# Patient Record
Sex: MALE | Race: OTHER
[De-identification: names, ages, dates, MRNs, and addresses within clinical notes are randomized per-mention and may not be internally consistent; named-entity substitution may affect disease eponyms.]

---

## 2017-06-27 ENCOUNTER — HOSPITAL ENCOUNTER (OUTPATIENT)
Dept: HOSPITAL 53 - M SMT | Age: 72
End: 2017-06-27
Attending: NURSE PRACTITIONER
Payer: MEDICARE

## 2017-06-27 DIAGNOSIS — R82.90: Primary | ICD-10-CM

## 2017-06-27 PROCEDURE — 87186 SC STD MICRODIL/AGAR DIL: CPT

## 2017-06-27 PROCEDURE — 87088 URINE BACTERIA CULTURE: CPT

## 2017-06-27 PROCEDURE — 51798 US URINE CAPACITY MEASURE: CPT

## 2017-06-27 PROCEDURE — 81001 URINALYSIS AUTO W/SCOPE: CPT

## 2017-11-09 ENCOUNTER — HOSPITAL ENCOUNTER (OUTPATIENT)
Dept: HOSPITAL 53 - M LAB REF | Age: 72
End: 2017-11-09
Attending: INTERNAL MEDICINE
Payer: MEDICARE

## 2017-11-09 DIAGNOSIS — N39.0: Primary | ICD-10-CM

## 2017-11-29 ENCOUNTER — HOSPITAL ENCOUNTER (OUTPATIENT)
Dept: HOSPITAL 53 - M SMT | Age: 72
End: 2017-11-29
Attending: NURSE PRACTITIONER
Payer: MEDICARE

## 2017-11-29 DIAGNOSIS — N39.0: Primary | ICD-10-CM

## 2017-11-29 PROCEDURE — 51798 US URINE CAPACITY MEASURE: CPT

## 2017-11-29 PROCEDURE — 87088 URINE BACTERIA CULTURE: CPT

## 2017-11-29 PROCEDURE — 87186 SC STD MICRODIL/AGAR DIL: CPT

## 2017-11-29 PROCEDURE — 81001 URINALYSIS AUTO W/SCOPE: CPT

## 2017-12-13 ENCOUNTER — HOSPITAL ENCOUNTER (OUTPATIENT)
Dept: HOSPITAL 53 - M SFHCCAPE | Age: 72
End: 2017-12-13
Attending: PHYSICIAN ASSISTANT
Payer: MEDICARE

## 2017-12-13 DIAGNOSIS — L03.115: Primary | ICD-10-CM

## 2017-12-14 ENCOUNTER — HOSPITAL ENCOUNTER (INPATIENT)
Dept: HOSPITAL 53 - M ED | Age: 72
LOS: 8 days | Discharge: HOME HEALTH | DRG: 291 | End: 2017-12-22
Attending: INTERNAL MEDICINE | Admitting: HOSPITALIST
Payer: MEDICARE

## 2017-12-14 VITALS — BODY MASS INDEX: 39.47 KG/M2 | HEIGHT: 74 IN | WEIGHT: 307.54 LBS

## 2017-12-14 DIAGNOSIS — Z66: ICD-10-CM

## 2017-12-14 DIAGNOSIS — I25.10: ICD-10-CM

## 2017-12-14 DIAGNOSIS — Z79.82: ICD-10-CM

## 2017-12-14 DIAGNOSIS — Z87.891: ICD-10-CM

## 2017-12-14 DIAGNOSIS — E66.9: ICD-10-CM

## 2017-12-14 DIAGNOSIS — D50.9: ICD-10-CM

## 2017-12-14 DIAGNOSIS — I70.0: ICD-10-CM

## 2017-12-14 DIAGNOSIS — E87.3: ICD-10-CM

## 2017-12-14 DIAGNOSIS — I13.0: Primary | ICD-10-CM

## 2017-12-14 DIAGNOSIS — I87.2: ICD-10-CM

## 2017-12-14 DIAGNOSIS — I27.29: ICD-10-CM

## 2017-12-14 DIAGNOSIS — Z79.899: ICD-10-CM

## 2017-12-14 DIAGNOSIS — Z85.46: ICD-10-CM

## 2017-12-14 DIAGNOSIS — L97.919: ICD-10-CM

## 2017-12-14 DIAGNOSIS — N18.4: ICD-10-CM

## 2017-12-14 DIAGNOSIS — E87.6: ICD-10-CM

## 2017-12-14 DIAGNOSIS — E11.22: ICD-10-CM

## 2017-12-14 DIAGNOSIS — I25.2: ICD-10-CM

## 2017-12-14 DIAGNOSIS — I50.33: ICD-10-CM

## 2017-12-14 DIAGNOSIS — E78.5: ICD-10-CM

## 2017-12-14 DIAGNOSIS — I48.2: ICD-10-CM

## 2017-12-14 DIAGNOSIS — Z79.01: ICD-10-CM

## 2017-12-14 DIAGNOSIS — G47.33: ICD-10-CM

## 2017-12-14 DIAGNOSIS — E11.42: ICD-10-CM

## 2017-12-14 LAB
ALBUMIN SERPL BCG-MCNC: 3.2 GM/DL (ref 3.2–5.2)
ALBUMIN/GLOB SERPL: 0.91 {RATIO} (ref 1–1.93)
ALP SERPL-CCNC: 106 U/L (ref 45–117)
ALT SERPL W P-5'-P-CCNC: 21 U/L (ref 12–78)
ANION GAP SERPL CALC-SCNC: 10 MEQ/L (ref 8–16)
AST SERPL-CCNC: 21 U/L (ref 7–37)
BASOPHILS # BLD AUTO: 0 10^3/UL (ref 0–0.2)
BASOPHILS NFR BLD AUTO: 0.2 % (ref 0–1)
BILIRUB CONJ SERPL-MCNC: 1 MG/DL (ref 0–0.2)
BILIRUB SERPL-MCNC: 2.4 MG/DL (ref 0.2–1)
BUN SERPL-MCNC: 100 MG/DL (ref 7–18)
CALCIUM SERPL-MCNC: 8.7 MG/DL (ref 8.8–10.2)
CHLORIDE SERPL-SCNC: 104 MEQ/L (ref 98–107)
CO2 SERPL-SCNC: 25 MEQ/L (ref 21–32)
CREAT SERPL-MCNC: 2.9 MG/DL (ref 0.7–1.3)
EOSINOPHIL # BLD AUTO: 0.1 10^3/UL (ref 0–0.5)
EOSINOPHIL NFR BLD AUTO: 0.6 % (ref 0–3)
ERYTHROCYTE [DISTWIDTH] IN BLOOD BY AUTOMATED COUNT: 20.7 % (ref 11.5–14.5)
GFR SERPL CREATININE-BSD FRML MDRD: 22.8 ML/MIN/{1.73_M2} (ref 42–?)
GLUCOSE SERPL-MCNC: 107 MG/DL (ref 83–110)
IMM GRANULOCYTES NFR BLD: 0.8 % (ref 0–0)
INR PPP: 2.56
LYMPHOCYTES # BLD AUTO: 0.8 10^3/UL (ref 1.5–4.5)
LYMPHOCYTES NFR BLD AUTO: 6.3 % (ref 24–44)
MCH RBC QN AUTO: 24.4 PG (ref 27–33)
MCHC RBC AUTO-ENTMCNC: 29.8 G/DL (ref 32–36.5)
MCV RBC AUTO: 81.9 FL (ref 80–96)
MONOCYTES # BLD AUTO: 1 10^3/UL (ref 0–0.8)
MONOCYTES NFR BLD AUTO: 8.1 % (ref 0–5)
NEUTROPHILS # BLD AUTO: 10.8 10^3/UL (ref 1.8–7.7)
NEUTROPHILS NFR BLD AUTO: 84 % (ref 36–66)
NRBC BLD AUTO-RTO: 0 % (ref 0–0)
PLATELET # BLD AUTO: 212 10^3/UL (ref 150–450)
POTASSIUM SERPL-SCNC: 3.8 MEQ/L (ref 3.5–5.1)
PROT SERPL-MCNC: 6.7 GM/DL (ref 6.4–8.2)
SODIUM SERPL-SCNC: 139 MEQ/L (ref 136–145)
WBC # BLD AUTO: 12.8 10^3/UL (ref 4–10)

## 2017-12-14 RX ADMIN — PRAVASTATIN SODIUM SCH MG: 20 TABLET ORAL at 21:00

## 2017-12-14 RX ADMIN — GABAPENTIN SCH MG: 300 CAPSULE ORAL at 22:43

## 2017-12-14 RX ADMIN — ASPIRIN SCH MG: 81 TABLET ORAL at 22:43

## 2017-12-14 NOTE — REPUSA
CLINICAL HISTORY:  Shortness of breath.

 

TECHNIQUE:  Multiple axial, coronal, sagittal CT images were obtained through chest without IV contra
st material.

 

COMMENTS:

 

There is no evidence of pleural or parenchymal-based mass.  Small right pleural effusion and no left 
pleural effusion is present.  There is no evidence of hilar or mediastinal lymphadenopathy.  The hear
t is severely enlarged.  Pulmonary venous congestive changes are present.  

 

Note is made of diffusely increased interstitial lungs markings with peripheral and bibasilar predomi
nance compatible with pulmonary fibrosis.  Few calcified nodules are present in the right lower lobe,
 the largest measures 4 mm.

 

Mild scattered upper lobe predominant centrilobular emphysema is seen.

 

Several small calcified granulomas are present in the left lower lobe measuring up to 2.5 mm.

 

The visualized portions of the liver are of uniform attenuation without mass or defect.  There is no 
intra or extrahepatic biliary ductal dilatation.  The spleen is unremarkable.  The visualized pancrea
s is of normal contour and attenuation characteristics.  There is no evidence of adrenal mass.  The v
isualized portions of the kidneys present no abnormalities.

 

The bony structures are free of lytic or blastic lesions.  Multilevel degenerative changes are seen i
nvolving the thoracic spine. Scattered calcifications are seen involving the aorta and visualized lane
or branches compatible with atherosclerosis.

 

IMPRESSION:

 

1.  Cardiomegaly.

2.  CHF.

3.  Right pleural effusion.

4.  Sequelae of prior granulomatous disease.

5.  Evidence of pulmonary fibrosis.

6.  Mild scattered emphysema.

 

     Electronically signed by NURIS CAMACHO MD on 12/14/2017 08:05:42 PM ET

## 2017-12-14 NOTE — HPE
DATE OF ADMISSION:  12/14/2017

 

ATTENDING PHYSICIAN:  Dr. Barak Zimmerman

 

CARDIOLOGIST:  Dr. Figueredo

 

NEPHROLOGIST:  Dr. Moreno

 

CHIEF COMPLAINT:  Worsening swelling of bilateral lower extremities.

 

HISTORY OF THE PRESENT ILLNESS:  The patient is a 72-year-old white male with

multiple chronic medical conditions, including congestive heart failure (CHF) 
and

chronic kidney disease stage IV, presented to the emergency room (ER) for

evaluation of above complaints. The history is provided by himself; however, he

is a poor historian. Per the patient, he has a history of CHF, and his

cardiologist is Dr. Figueredo. He was taking torsemide 40 mg twice a day at home.

Also, he has chronic kidney disease stage IV and he followed up with Dr. Moreno.  He stated recently he was treated with 10 days antibiotics, but he

could not remember the name of antibiotic ordered for possible urinary tract

infection by urologist. In the last couple of days, he said he has more swelling

in his bilateral lower extremities, particularly there is some fluid that came

out from his right lower extremity and that is why he went to urgent care and

then transferred here to the ER for further evaluation. In the ER, he was found

to have fluid overload, and he was given intravenous (IV) Lasix in the ER.

Meanwhile, medicine service called for admission. Otherwise, no other 
complaints.

 

 

REVIEW OF SYSTEMS: Denies fever. No chills. No headache. No blurred vision. No

shortness of breath. No coughing. No chest pain. No abdominal pain. No diarrhea.

No constipation. No dysuria. No weakness in the arms or lower extremities but

general weakness. All other systems were reviewed but negative.

 

PAST MEDICAL HISTORY:

1. Hypertension.

2. Chronic kidney disease stage IV.

3. Chronic atrial fibrillation, on Coumadin.

4. Congestive heart failure, ejection fraction (EF) is around 35%.

5. Type 2 diabetes, which is diet controlled.

 

PAST SURGICAL HISTORY: Stone was removed from his bladder and prostate cancer

with radiation therapy.

 

ALLERGIES:  No known drug allergies.

 

SOCIAL HISTORY: He has remote tobacco use, quit 40 years. No alcohol abuse. No

illicit drug abuse. He is living with wife. He is a [employer].

 

FAMILY HISTORY: Positive for gastric cancer.

 

MEDICATIONS:  Reviewed including:

- torsemide 40 mg twice a day

- Coumadin 2 mg daily

- potassium chloride 10 mEq daily

- Neurontin 300 mg three times a day

- pravastatin 80 mg by mouth daily

- Coreg 12.5 mg by mouth daily

- allopurinol 300 mg by mouth daily

 

PHYSICAL EXAMINATION:

VITAL SIGNS: Temperature 97.5, heart rate is 113, respirations 22, blood 
pressure

110/57, oxygen saturation is 95% on 2 liters of oxygen.

GENERAL: He is awake, alert, oriented times three. He is not in acute distress.

HEENT: Atraumatic. Pupils equal, round, reactive to light. Extraocular muscles

intact. No jaundice. Ears, nose and throat are normal.

NECK: No jugular venous distention (JVD), no bruits.

LUNGS: Clear. No wheezing, no crackles.

HEART; S1, S2, mild tachycardia, irregular but no murmur.

ABDOMEN: Soft. Bowel sounds positive, nontender.

LOWER EXTREMITIES: He has +2 edema in his bilateral lower extremities.

NEUROLOGIC: Nonfocal.

SKIN: No rash.

PSYCHOLOGIC: No acute psychosis.

 

DIAGNOSTIC AND LAB STUDIES: Include the following: CBC and differential showed

WBC 12.8, hemoglobin and hematocrit 12 over 40, platelets 212. Sodium 139,

potassium 3.8, chloride 104, bicarbonate 25, , creatinine 2.9, glucose

107, bilirubin is 2.4 but liver enzymes normal. Troponin negative. BNP is up to

5297 and INR was 2.5.

 

Chest CT as well as chest x-ray were reviewed, not significant.

 

IMPRESSION:

1. Acute on chronic systolic congestive heart failure.

2. Chronic kidney disease stage IV.

3. Chronic atrial fibrillation.

4. Type 2 diabetes.

5. Hypertension.

 

PLAN: He will be admitted to the progressive care unit (PCU). It seems like he

has  fluid overload, so I will treat him with IV Lasix 60 mg IV

twice a day. Will closely monitor his electrolytes as well as his kidney

function. I will schedule a 2D echocardiogram and will consult Dr. Figueredo. He

has abnormal urinalysis, but I do not think he has a urinary tract infection,

probably related to his prostate cancer plus he was treated recently with

antibiotics.  Due to mildly increased creatinine from his baseline, I will 
schedule

a renal ultrasound to rule out any obstruction. He is on Coumadin for atrial

fibrillation, which is therapeutic, so he does not need actual deep vein

thrombosis (DVT) prophylaxis.

[facility]

## 2017-12-14 NOTE — REP
HISTORY: Dyspnea.

 

COMPARISON: 12/28/2015

 

The technique utilized in obtaining the radiograph has magnified the cardiac

silhouette and accentuated the interstitial markings.

 

 

 

There is global cardiomegaly. There is pulmonary vascular redistribution and a

haziness seen throughout the pulmonary vascularity. There are no patchy opacities

or pleural effusions.

 

IMPRESSION:

 

CHF.

 

 

Signed by

Aamir Pulliam DO 12/15/2017 02:10 P

## 2017-12-14 NOTE — ECGEPIP
Stationary ECG Study

                           [facility] - ED

                                       

                                       Test Date:    2017

Pat Name:     FEI CAMACHO           Department:   

Patient ID:   [id number]                 Room:         -

Gender:       M                        Technician:   rachna

:          1945               Requested By: Toyin Scanlon 

Order Number: [id number]     Reading MD:   Curry Reis

                                 Measurements

Intervals                              Axis          

Rate:         76                       P:            

NJ:           0                        QRS:          158

QRSD:         105                      T:            -7

QT:           426                                    

QTc:          482                                    

                           Interpretive Statements

ATRIAL FIBRILLATION

INCOMPLETE RIGHT BUNDLE BRANCH BLOCK

RIGHT VENTRICULAR HYPERTROPHY

POSSIBLE ANTERIOR MYOCARDIAL INFARCTION, PROBABLY OLD

POSSIBLE INFERIOR MYOCARDIAL INFARCTION, PROBABLY OLD

SIMILAR TO 12/28/15

 

Electronically Signed On 2017 20:20:59 EST by Curry Reis

## 2017-12-15 VITALS — SYSTOLIC BLOOD PRESSURE: 102 MMHG | DIASTOLIC BLOOD PRESSURE: 62 MMHG

## 2017-12-15 VITALS — DIASTOLIC BLOOD PRESSURE: 59 MMHG | SYSTOLIC BLOOD PRESSURE: 101 MMHG

## 2017-12-15 VITALS — DIASTOLIC BLOOD PRESSURE: 63 MMHG | SYSTOLIC BLOOD PRESSURE: 106 MMHG

## 2017-12-15 LAB
ALBUMIN SERPL BCG-MCNC: 3 GM/DL (ref 3.2–5.2)
ALBUMIN/GLOB SERPL: 1 {RATIO} (ref 1–1.93)
ALP SERPL-CCNC: 95 U/L (ref 45–117)
ALT SERPL W P-5'-P-CCNC: 19 U/L (ref 12–78)
ANION GAP SERPL CALC-SCNC: 12 MEQ/L (ref 8–16)
AST SERPL-CCNC: 21 U/L (ref 7–37)
BASOPHILS # BLD AUTO: 0 10^3/UL (ref 0–0.2)
BASOPHILS NFR BLD AUTO: 0.4 % (ref 0–1)
BILIRUB SERPL-MCNC: 2 MG/DL (ref 0.2–1)
BUN SERPL-MCNC: 92 MG/DL (ref 7–18)
CALCIUM SERPL-MCNC: 8.5 MG/DL (ref 8.8–10.2)
CHLORIDE SERPL-SCNC: 105 MEQ/L (ref 98–107)
CO2 SERPL-SCNC: 25 MEQ/L (ref 21–32)
CREAT SERPL-MCNC: 2.77 MG/DL (ref 0.7–1.3)
EOSINOPHIL # BLD AUTO: 0.2 10^3/UL (ref 0–0.5)
EOSINOPHIL NFR BLD AUTO: 2.2 % (ref 0–3)
ERYTHROCYTE [DISTWIDTH] IN BLOOD BY AUTOMATED COUNT: 20.6 % (ref 11.5–14.5)
GFR SERPL CREATININE-BSD FRML MDRD: 24 ML/MIN/{1.73_M2} (ref 42–?)
GLUCOSE SERPL-MCNC: 100 MG/DL (ref 83–110)
IMM GRANULOCYTES NFR BLD: 0.6 % (ref 0–0)
INR PPP: 2.72
LYMPHOCYTES # BLD AUTO: 0.9 10^3/UL (ref 1.5–4.5)
LYMPHOCYTES NFR BLD AUTO: 8 % (ref 24–44)
MAGNESIUM SERPL-MCNC: 2.2 MG/DL (ref 1.8–2.4)
MCH RBC QN AUTO: 24.7 PG (ref 27–33)
MCHC RBC AUTO-ENTMCNC: 30.2 G/DL (ref 32–36.5)
MCV RBC AUTO: 81.8 FL (ref 80–96)
MONOCYTES # BLD AUTO: 1 10^3/UL (ref 0–0.8)
MONOCYTES NFR BLD AUTO: 9.2 % (ref 0–5)
NEUTROPHILS # BLD AUTO: 8.7 10^3/UL (ref 1.8–7.7)
NEUTROPHILS NFR BLD AUTO: 79.6 % (ref 36–66)
NRBC BLD AUTO-RTO: 0 % (ref 0–0)
PLATELET # BLD AUTO: 214 10^3/UL (ref 150–450)
POTASSIUM SERPL-SCNC: 3.6 MEQ/L (ref 3.5–5.1)
PROT SERPL-MCNC: 6 GM/DL (ref 6.4–8.2)
SODIUM SERPL-SCNC: 142 MEQ/L (ref 136–145)
WBC # BLD AUTO: 10.9 10^3/UL (ref 4–10)

## 2017-12-15 RX ADMIN — DOCUSATE SODIUM,SENNOSIDES SCH TAB: 50; 8.6 TABLET, FILM COATED ORAL at 20:30

## 2017-12-15 RX ADMIN — DEXTROSE MONOHYDRATE SCH MG: 50 INJECTION, SOLUTION INTRAVENOUS at 09:36

## 2017-12-15 RX ADMIN — WARFARIN SODIUM SCH MG: 2.5 TABLET ORAL at 17:52

## 2017-12-15 RX ADMIN — PRAVASTATIN SODIUM SCH MG: 20 TABLET ORAL at 20:30

## 2017-12-15 RX ADMIN — ASPIRIN SCH MG: 81 TABLET ORAL at 20:29

## 2017-12-15 RX ADMIN — GABAPENTIN SCH MG: 300 CAPSULE ORAL at 20:29

## 2017-12-15 RX ADMIN — DEXTROSE MONOHYDRATE SCH MG: 50 INJECTION, SOLUTION INTRAVENOUS at 20:28

## 2017-12-15 RX ADMIN — DEXTROSE MONOHYDRATE SCH MG: 50 INJECTION, SOLUTION INTRAVENOUS at 21:00

## 2017-12-15 RX ADMIN — DOCUSATE SODIUM,SENNOSIDES SCH TAB: 50; 8.6 TABLET, FILM COATED ORAL at 09:37

## 2017-12-15 RX ADMIN — CALCITRIOL SCH MCG: 0.25 CAPSULE ORAL at 09:36

## 2017-12-15 NOTE — REP
Renal ultrasound:

 

The right kidney measures 13.7 by 6.6 x 7.1 cm.

The left kidney measures 2.7 by 4.8 by 5.7 cm.

 

Renal cortical echogenicity is normal bilaterally.

There is no hydronephrosis on the right on the left.

 

There are multiple left renal cysts, largest measuring up to 4.1 cm.  There are

no right renal cysts.  No solid masses.

 

There are no calculi.

 

Bladder ultrasound:

 

The bladder is distended with 490 ml of fluid.  No bladder wall polyps or masses

are identified.  The bladder is otherwise unremarkable.

 

Impression:

 

The kidneys at least 2 cm for in length and the right kidney.  There are multiple

left renal cysts.

 

Otherwise, negative renal ultrasound.  No

 

 

Signed by

Erik Santamaria MD 12/15/2017 07:23 A

## 2017-12-15 NOTE — IPN
DATE:  12/15/2017

 

Patient seen and examined.  Admitted overnight.  Reported respirations much

improved.  Bilateral lower extremity swelling also much improved.  With right

lower extremity there is a ruptured blister with drainage.  As per patient, has

been going on for the past 5-6 days.  Denies any chest pain, pressure, or

discomfort.  Denies any fevers or chills.  No sick contacts.  Lives at home with

his wife.

 

VITAL SIGNS:  Temperature 98.3, pulse 76, respirations 18, blood pressure 138/63,

pulse oximetry 96% on room air.

 

LABORATORY DATA:

WBC 10.9, hemoglobin and hematocrit 11.5/38.1, platelets 214.

 

Chemistry:  Sodium 142, potassium 3.6, chloride 105, bicarbonate 25, BUN 92,

creatinine 2.77.

 

PHYSICAL EXAMINATION:

GENERAL:  Patient alert and oriented times three, obese, in no acute distress.

HEENT:  Normocephalic, atraumatic.

PULMONARY:  Bilateral clear to auscultation.  No wheezing, rales, or rhonchi.

CARDIAC:  2/6 systolic murmur, regular, S1, S2.

ABDOMEN:  Soft, nontender, obese.

EXTREMITIES:  1+ bilateral lower extremity edema, with venous stasis skin

changes, right lower extremity, around the shin area with an ulcer with skin

breakdown with purulent drainage.

 

ASSESSMENT AND PLAN:  This is a 72-year-old male patient with underlying medical

history of congestive heart failure (CHF), with ejection fraction of 35%, chronic

kidney disease (CKD) stage IV, baseline creatinine 2.6, chronic atrial

fibrillation, on Coumadin, type 2 diabetes, diet controlled, hypertension,

presented to the emergency room with worsening lower extremity swelling and also

ulcer with purulent drainage.

 

1.  Right lower extremity venous stasis ulcers.  Case discussed with Dr. Stillerman.  Physical therapy (PT), wound care has been consulted.  Recommending

venous ultrasound, standing, as per Dr. Stillerman with Vashe and Optifoam

dressing daily and leg elevation.  Continue diuresis as below.  Followup with Dr. Stillerman as outpatient.  Will make appointment for the patient upon discharge.

 

2.  Bilateral lower extremity swelling, likely secondary to congestive heart

failure (CHF) exacerbation with systolic dysfunction, ejection fraction (EF) of

35%.  Diuresis.  Cardiac enzymes.  Telemetry monitoring.  Followup kidney

function.  Dr. Figueredo has been consulted.  Strict intake and output, daily

weight.  Continue statin, aspirin.

 

3.  Atrial fibrillation.  Continue beta blockers.  Anticoagulation with Coumadin.

Followup INR.

 

4.  Dyslipidemia.  Continue statin.

 

5.  Diabetes.  Diet controlled.  Followup glucose.

 

6.  Peripheral neuropathy.  Continue Neurontin.

 

7.  Hypertension.  Monitor blood pressure.  Continue Lasix and Coreg, adjust as

needed.  Followup electrolytes.

 

8.  Deep venous thrombosis (DVT) prophylaxis.  Patient on Coumadin for atrial

fibrillation with therapeutic INR.

 

DISPOSITION:  Pending clinical improvement, wound care, and a cardiology

consultation.  Echocardiogram has also been ordered.  Need outpatient followup

with Dr. Stillerman.

## 2017-12-15 NOTE — REP
BILATERAL LOWER EXTREMITY DUPLEX DOPPLER VENOUS ULTRASOUND WITH EVALUATION FOR

VENOUS REFLUX:

 

Real-time compression and duplex Doppler interrogation of bilateral lower

extremity deep venous systems is performed.

 

Bilaterally, common femoral, superficial femoral and popliteal veins are fully

compressible with transducer pressure and demonstrate normal spontaneous and

phasic flow without evidence of deep venous thrombosis. There is focal dilatation

of greater saphenous vein in the upper thigh as well as in the mid thigh on the

left.

 

Evaluation for venous reflux on the right demonstrates reflux in the common

femoral vein. There is an anterior accessory greater saphenous vein present with

reflux. There is no reflux in the proximal to mid greater saphenous vein which

measures 6 and 7 mm in AP dimension respectively. There is reflux in the greater

saphenous vein at the level of the knee with a duration of 3.6 seconds, AP

diameter of that vessel is 6 mm. There is no reflux in any portion of the

superficial femoral vein, popliteal vein or lesser saphenous vein which measures

4 mm. Anterior accessory greater saphenous vein communicates with the greater

saphenous vein at the distal thigh.

 

On the left, there is reflux in the common femoral vein. There is no evidence of

an anterior accessory greater saphenous vein. There is reflux in the greater

saphenous vein at the saphenofemoral junction with a duration of 2.1 seconds, AP

diameter 12 mm. There is reflux in the greater saphenous vein at the midthigh

with a duration of 2.7 seconds, AP diameter 9 mm, and there is reflux n the

greater saphenous vein at the level of the knee with a duration of 2.5 seconds,

AP diameter 11 mm. There is no reflux in any portion of the superficial femoral

vein, popliteal vein or lesser saphenous vein.

 

 

Signed by

Erik Falcon MD 12/15/2017 05:04 P

## 2017-12-16 VITALS — SYSTOLIC BLOOD PRESSURE: 120 MMHG | DIASTOLIC BLOOD PRESSURE: 69 MMHG

## 2017-12-16 VITALS — DIASTOLIC BLOOD PRESSURE: 58 MMHG | SYSTOLIC BLOOD PRESSURE: 102 MMHG

## 2017-12-16 VITALS — DIASTOLIC BLOOD PRESSURE: 54 MMHG | SYSTOLIC BLOOD PRESSURE: 95 MMHG

## 2017-12-16 VITALS — SYSTOLIC BLOOD PRESSURE: 122 MMHG | DIASTOLIC BLOOD PRESSURE: 62 MMHG

## 2017-12-16 VITALS — SYSTOLIC BLOOD PRESSURE: 96 MMHG | DIASTOLIC BLOOD PRESSURE: 52 MMHG

## 2017-12-16 VITALS — DIASTOLIC BLOOD PRESSURE: 67 MMHG | SYSTOLIC BLOOD PRESSURE: 110 MMHG

## 2017-12-16 LAB
ANION GAP SERPL CALC-SCNC: 6 MEQ/L (ref 8–16)
BUN SERPL-MCNC: 95 MG/DL (ref 7–18)
CALCIUM SERPL-MCNC: 8.9 MG/DL (ref 8.8–10.2)
CHLORIDE SERPL-SCNC: 105 MEQ/L (ref 98–107)
CO2 SERPL-SCNC: 30 MEQ/L (ref 21–32)
CREAT SERPL-MCNC: 2.5 MG/DL (ref 0.7–1.3)
ERYTHROCYTE [DISTWIDTH] IN BLOOD BY AUTOMATED COUNT: 21 % (ref 11.5–14.5)
GFR SERPL CREATININE-BSD FRML MDRD: 27 ML/MIN/{1.73_M2} (ref 42–?)
GLUCOSE SERPL-MCNC: 90 MG/DL (ref 83–110)
INR PPP: 2.66
MAGNESIUM SERPL-MCNC: 2.3 MG/DL (ref 1.8–2.4)
MCH RBC QN AUTO: 23.7 PG (ref 27–33)
MCHC RBC AUTO-ENTMCNC: 29.2 G/DL (ref 32–36.5)
MCV RBC AUTO: 81.3 FL (ref 80–96)
NRBC BLD AUTO-RTO: 0 % (ref 0–0)
PLATELET # BLD AUTO: 184 10^3/UL (ref 150–450)
POTASSIUM SERPL-SCNC: 4.2 MEQ/L (ref 3.5–5.1)
SODIUM SERPL-SCNC: 141 MEQ/L (ref 136–145)
WBC # BLD AUTO: 11.2 10^3/UL (ref 4–10)

## 2017-12-16 RX ADMIN — WARFARIN SODIUM SCH MG: 2.5 TABLET ORAL at 16:09

## 2017-12-16 RX ADMIN — GABAPENTIN SCH MG: 300 CAPSULE ORAL at 21:16

## 2017-12-16 RX ADMIN — PRAVASTATIN SODIUM SCH MG: 20 TABLET ORAL at 21:15

## 2017-12-16 RX ADMIN — DEXTROSE MONOHYDRATE SCH MG: 50 INJECTION, SOLUTION INTRAVENOUS at 21:15

## 2017-12-16 RX ADMIN — CALCITRIOL SCH MCG: 0.25 CAPSULE ORAL at 08:43

## 2017-12-16 RX ADMIN — DEXTROSE MONOHYDRATE SCH MG: 50 INJECTION, SOLUTION INTRAVENOUS at 08:43

## 2017-12-16 RX ADMIN — DOCUSATE SODIUM,SENNOSIDES SCH TAB: 50; 8.6 TABLET, FILM COATED ORAL at 21:16

## 2017-12-16 RX ADMIN — ASPIRIN SCH MG: 81 TABLET ORAL at 21:15

## 2017-12-16 RX ADMIN — DOCUSATE SODIUM,SENNOSIDES SCH TAB: 50; 8.6 TABLET, FILM COATED ORAL at 08:43

## 2017-12-16 NOTE — IPNPDOC
Text Note


Date of Service


The patient was seen on 12/16/17.





NOTE


Subjective:


Patient seen and examined.  Patient denies any new medical complaints. States 

his breathing is improving. He expressed concerns regarding his weeping right 

lower extremity edema.





Objective:


GENERAL:  NAD, lying comfortably in bed


HEENT:  NC/AT, EOMI


PULMONARY:  CTA B/L


CARDIAC:  2/6 systolic murmur, regular, +S1S2.


ABDOMEN:  Soft, NT, +BS, obese.


EXTREMITIES:  1+ bilateral lower extremity edema, with venous stasis skin


changes, right lower extremity, around the shin area with an ulcer with skin


breakdown with purulent drainage.


 


ASSESSMENT AND PLAN:  This is a 72-year-old male patient with underlying medical


history of congestive heart failure (CHF), with ejection fraction of 35%, 

chronic


kidney disease (CKD) stage IV, baseline creatinine 2.6, chronic atrial


fibrillation, on Coumadin, type 2 diabetes, diet controlled, hypertension,


presented to the emergency room with worsening lower extremity swelling and also


ulcer with purulent drainage.


 


#RLE venous stasis ulcers


- Case has been discussed with Dr. Stillerman.


- PT wound care has been consulted


- US negative for DVT


- as per wound care - Vashe and Optifoam dressing daily and leg elevation


- Continue diuresis as below


- Followup with Dr. Stillerman as outpatient


 


#Decompensated CHF


- systolic dysfunction, ejection fraction (EF) of 35%


- continue IV lasix - consider increasing dose - slightly positive fluid 

balance as per documentation, no change in documented weight


- Telemetry monitoring.


- Followup renal function


- Follow as per cardiology


- strict I'O's, daily weight


- Continue statin, aspirin.





#episode of NSVT


- asymptomatic


- VSS


- check electrolytes


- consider transfer to tele


 


#Atrial fibrillation.  Continue beta blockers.  Anticoagulation with Coumadin.


Followup INR.


 


#Dyslipidemia.  Continue statin.


 


#Diabetes.  Diet controlled.  Followup glucose.


 


#Peripheral neuropathy.  Continue Neurontin.


 


#Hypertension.  Monitor blood pressure.  Continue Lasix and Coreg, adjust as


needed.  Followup electrolytes.


 


#Deep venous thrombosis (DVT) prophylaxis.  Patient on Coumadin for atrial


fibrillation with therapeutic INR.





VS,Fishbone, I+O


VS, Fishbone, I+O


Laboratory Tests


12/16/17 05:08








Red Blood Count 4.93, Mean Corpuscular Volume 81.3, Mean Corpuscular Hemoglobin 

23.7 L, Mean Corpuscular Hemoglobin Concent 29.2 L, Red Cell Distribution Width 

21.0 H, Calcium Level 8.9








Vital Signs








  Date Time  Temp Pulse Resp B/P (MAP) Pulse Ox O2 Delivery O2 Flow Rate FiO2


 


12/16/17 08:43  78  122/62    


 


12/16/17 06:00 97.9  18  92 Nasal Cannula 3.0 

















KHADRA HERNANDEZ MD Dec 16, 2017 09:02

## 2017-12-16 NOTE — CR
DATE OF CONSULTATION:  12/15/2017

 

REFERRING PHYSICIAN:  Dr. Cooper.

 

REASON FOR CONSULTATION:  Acute on chronic heart failure with preserved ejection

fraction.

 

Mr. Ancelmo Sexton is a pleasant 72-year-old man with coronary artery disease,

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

ischemic cardiomyopathy and now chronic systolic heart failure with preserved

ejection fraction, systemic hypertension, abnormal ECG, frequent PVCs,

hyperlipidemia, chronic kidney disease stage IV, nephrolithiasis, iron deficiency

anemia, type 2 diabetes, obstructive sleep apnea (continuous positive airway

pressure (CPAP) intolerant), and obesity.  Patient presented to the hospital for

admission yesterday (2017) after being seen by his primary care provider

earlier that day when he was noted to have bilateral edema in both legs, venous

stasis ulcer that was open and draining and a severely elevated brain natruretic

peptide level.  Patient reports he has had progressive buildup of edema in both

legs for the past 2 months and also the past 2 months has had progressive

worsening of exertional dyspnea to the point of exertional dyspnea walking around

his garage.  No orthopnea or paroxysmal nocturnal dyspnea (PND).  No chest, neck,

jaw or upper extremity pain, pressure, tightness or squeezing with or without

exertion.  No presyncope or syncope.  No palpitations.  No embolic events.  No

intermittent claudication.

 

I shall summarize additional parts of his prior cardiac history.  He was first

discovered to have evidence of a sign of heart attack about 6 or 7 years ago.  He

subsequently underwent further evaluation with cardiac catheterization at [facility] in [place] and he was told he had "dead heart muscle."  No

percutaneous coronary intervention was indicated at that time.

 

A stress echocardiogram from [facility] in [place], [place]

2003, reported poor cardiopulmonary fitness for age.  Exercise induced

regional wall motion abnormalities consistent with multivessel coronary artery

disease (CAD).  Resting left ventricular ejection fraction (LVEF) 50-55%.

Moderate tricuspid regurgitation, estimated pulmonary systolic pressure 54 mmHg.

No regional wall motion abnormalities at rest, but with exercise stress there was

hypokinesis of the inferior basal segment, hypokinesis of the anterolateral wall

and hypokinesis involving the mid and distal anteroseptal region.  A diagnostic

heart catheterization was recommended at that time.

 

MUGA scan 2016 showed LVEF 46.4%.  Akinesis of inferior basal region of the

left ventricle with normal wall motion elsewhere.  Mild reduction overall LV

systolic function.  Normal right ventricle systolic function.

 

Holter monitor 2016, at which time patient was on carvedilol 25 mg twice a

day, showed atrial fibrillation throughout with heart rate 39-82 beats per minute

(BPM) with average heart rate of 60 BPM.  Very frequent PVCs 27,556 (29.1%)

including frequent runs with rates from  BPM and ranging in duration from

4-6 beats.  No symptoms in the Holter diary.

 

Echocardiogram Doppler 2015 from [facility] reported low

normal LV systolic function with LVEF estimated to be 50%.  Inferobasal

segmented.  Akinetic.  No other regional wall motion abnormalities.  Severe

biatrial enlargement.  Mild dilatation of the left ventricle and right ventricle.

Aortic valve sclerosis.  Mitral annular calcification.  Moderate tricuspid

regurgitation.  Mild pulmonary hypertension.  Mild mitral regurgitation.

 

PAST MEDICAL HISTORY:

1.  Chronic heart failure with preserved left ventricle systolic function.

2.  Old silent inferior wall myocardial infarct.

3.  Chronic atrial fibrillation.

4.  CAD.

5.  Type 2 diabetes.

6.  Systemic hypertension.

7.  Hyperlipidemia.

8.  Prostate cancer.

9.  Hyperparathyroidism.

10.  Ulcerative colitis.

11.  Abnormal ECG, very frequent multiform PVCs.

12.  Atherosclerosis of the abdominal aorta.

13.  Chronic kidney disease stage IV followed by Dr. Boilvar Moreno.

14.  Nephrolithiasis.

15.  Iron deficiency anemia.

16.  Obstructive sleep apnea (intolerant of CPAP).

17.  Obesity.

18.  Previous removal of bladder stones.

 

No known adverse drug reactions.

 

MEDICATIONS:  Prior to admission:

-  allopurinol 300 mg daily and additional allopurinol 100 mg daily (total dose

400 mg daily)

-  aspirin enteric coated 81 mg nightly

-  Calcitriol 0.25 mcg by mouth daily

-  carvedilol 12.5 mg twice a day

-  Flonase

-  gabapentin 300 mg nightly and additional dose as needed

-  potassium chloride 10 mEq twice a day

-  pravastatin 80 mg nightly

-  torsemide 40 mg twice a day

-  warfarin 2.5 mg daily or as directed

-  Ambien 10 mg nightly

 

CURRENT MEDICATIONS:  In hospital are as follows:

-  acetaminophen 650 mg every 4 hours as needed

-  aspirin 81 mg nightly

-  Calcitriol 0.25 mcg by mouth daily

-  carvedilol 12.5 mg twice a day

-  Flonase daily as needed

-  furosemide 60 mg IV twice a day

-  Neurontin 300 mg nightly and additional 300 mg nightly as needed  for pain

-  pravastatin 80 mg nightly

-  Senokot-S one tablet twice a day

-  warfarin 2.5 mg daily

-  Ambien 10 mg nightly

 

FAMILY HISTORY:  Father  from cancer.  Mother  from cancer.  One

sister had cancer.

 

SOCIAL HISTORY:  , resident of [place], [place], retired healthcare

marketing.  Currently independent with all activities of daily living.  Prior

smoking history of one pack per day times 20 years which he quit over 30 years

ago.  No alcohol.  Exercise limitations secondary to shortness of breath and

fatigue and heaviness in the legs.

 

REVIEW OF SYSTEMS:  As per history of present illness (HPI) above.  Wears glasses

for reading.  Decreased hearing.  Does not wear hearing aids.  Nonproductive

cough.  History of gastrointestinal (GI) ulcer.  History of dysphagia.  Chronic

kidney disease.  Bilateral leg swelling.  Stasis dermatitis in both legs and

recent weeping of venous ulcer right leg.  Impaired balance.  Numbness and

tingling of the left hand and legs.  No anxiety, depression or panic disorder.

Cold intolerance.  Iron deficiency anemia.  All other 10-point review of systems

questions otherwise negative other than HPI above.

 

PHYSICAL EXAMINATION:

Pleasant, obese, elderly man who appears chronologic age.  .

Height 74 inches, weight 134.1 kg, body mass index (BMI) 38.0.  Temperature 96.8,

pulse 75 (irregular), respiratory rate 20, blood pressure 101/59, oxygen

saturation 95% on oxygen 3 liters per minute by nasal cannula.

No dysmorphic features or deformities.  Not in any respiratory or psychological

distress.  No conjunctival pallor, scleral icterus or xanthomas.  Some dental

fillings.  Oral mucosa was moist and without pallor or cyanosis.  Trachea

midline. Thyroid not palpable.  Jugular venous pulsations were at 8 cm.

Respiratory expansion and effort was good.  No crackles or wheezes appreciated.

 

No left parasternal heaves, thrills or palpable heart sounds.  No palpable apex

beat.  First heart sound variable.  Second heart sound normal.  No S3.  No

pericardial friction rub.  Grade 1 systolic ejection murmur right second

interspace without radiation.  Carotids are normal in volume and contour and

without bruits.  Abdominal aorta not palpable due to abdominal obesity.  Femoral

pulses normal. Pedal pulses normal.  4 mm pitting edema present bilaterally with

extensive stasis dermatitis.  I did not unwrap the bandages that were covering

the patient's right leg where he is reported to have some open venous stasis

ulcers.  No varicose veins.  No clubbing, nailbed cyanosis or splinter

hemorrhages.

Bowel sounds positive.  Abdomen soft, nontender with normal bowel sounds.  No

abdominal bruits.  No hepatosplenomegaly or organomegaly.  Liver span not

measurable due to abdominal obesity.

Stool for occult blood not presently indicated.

Gait not fully tested because patient is under restricted activity.  No kyphosis

or scoliosis.  Gross motor strength and tone appear normal.  No fasciculations or

tremors.

Oriented to person, place and time.  Mood and affect normal.

 

I have reviewed the patient's electrocardiogram from 2017 at 1817 hours.

It shows atrial fibrillation, ventricular rate 76 BPM, incomplete right bundle

branch block, low precordial voltages, borderline low limb lead voltages, right

axis deviation, nonspecific ST-T abnormalities.  Poor R-wave progression.  Cannot

rule out old inferior wall myocardial infarct.

 

I have independently visualized the patient's portable AP chest x-ray sitting

obtained 2017 at 6:25 p.m.  It shows cardiomegaly despite the portable

technique.  Right pleural effusion.  Pulmonary vascular redistribution is

present.  Increased interstitial markings consistent with interstitial pulmonary

edema bilaterally.

 

Laboratory work 12/15/2017 was reviewed.  Hemoglobin 11.5, hematocrit 38.1, WBC

10.9,  platelets 214.  PT/INR 2.72.

 

Laboratory work 2017 showed sodium 139, potassium 3.8, chloride 104, CO2

25, , creatinine 2.90, estimated GFR 22.8, glucose 107, CPK-MB 3.0,

troponin I 0.04.

 

Laboratory work 2017 also showed NT-proBNP 5297, albumin 3.2, TSH 3.850,

total bilirubin elevated at 2.4 and direct bilirubin elevated at 1.0.

 

Laboratory work 12/15/2017 was reviewed:  Sodium 142, potassium 3.6, chloride

105, CO2 25, BUN 92, creatinine 2.77, estimated GFR 24.0, magnesium 2.0, albumin

3.0.

 

ASSESSMENT AND PLAN:

1.  Acute on chronic diastolic heart failure.  This is complicated by right heart

failure with chronic stasis dermatitis and now open weeping venous stasis ulcer.

At present, he is decompensated and has evidence of interstitial pulmonary edema.

Further making matters worse is the presence of chronic kidney disease stage IV.

Use of gabapentin and abdominal obesity and diabetes all likely contribute to the

presence of bilateral lower extremity edema.  I agree with intravenous (IV)

furosemide.  Continue carvedilol.  Because of the degree of renal dysfunction, he

is really not a candidate for angiotensin-converting enzyme inhibitor (ACEI) or

ARB.  Also for the same reason, he is not really a candidate to tolerate

mineralocorticoid receptor antagonist.  I will increase the dose of IV furosemide

because he does not have a strong net negative fluid balance thus far today.

 

2.  Coronary artery disease (CAD) (native vessel).  History of old silent

inferior wall myocardial infarct.  Previous cardiac catheterization.   I

recommend continued medical therapy.  Continue low-dose aspirin.  Continue

carvedilol.  Not presently a candidate for ACEI or ARB because of advanced kidney

disease.

 

3.  Old inferior wall myocardial infarct.  As per CAD category above.  Stable.

 

4.  Chronic atrial fibrillation.  Heart rate controlled on carvedilol.  Continue

carvedilol and warfarin.  Target international normalized ratio (INR) 2.5-3.0.

 

5.  Systemic hypertension.  Blood pressure controlled.  Will follow with you.

 

6.  Abnormal ECG.  ECG as described above.

 

7.  Frequent multiform PVCs.  No sustained ventricular tachycardia.

Asymptomatic.  Stable.

 

8.  Hyperlipidemia.  Continue statin therapy.  Recommend DASH diet.

 

9.  Obesity.  Associated CAD, hyperlipidemia, systemic hypertension, atrial

fibrillation, chronic kidney disease, type 2 diabetes.  Recommend DASH diet.

## 2017-12-17 VITALS — DIASTOLIC BLOOD PRESSURE: 56 MMHG | SYSTOLIC BLOOD PRESSURE: 114 MMHG

## 2017-12-17 VITALS — DIASTOLIC BLOOD PRESSURE: 61 MMHG | SYSTOLIC BLOOD PRESSURE: 118 MMHG

## 2017-12-17 VITALS — SYSTOLIC BLOOD PRESSURE: 106 MMHG | DIASTOLIC BLOOD PRESSURE: 58 MMHG

## 2017-12-17 VITALS — DIASTOLIC BLOOD PRESSURE: 54 MMHG | SYSTOLIC BLOOD PRESSURE: 104 MMHG

## 2017-12-17 VITALS — DIASTOLIC BLOOD PRESSURE: 60 MMHG | SYSTOLIC BLOOD PRESSURE: 90 MMHG

## 2017-12-17 VITALS — DIASTOLIC BLOOD PRESSURE: 68 MMHG | SYSTOLIC BLOOD PRESSURE: 110 MMHG

## 2017-12-17 VITALS — DIASTOLIC BLOOD PRESSURE: 64 MMHG | SYSTOLIC BLOOD PRESSURE: 120 MMHG

## 2017-12-17 LAB
ANION GAP SERPL CALC-SCNC: 9 MEQ/L (ref 8–16)
BUN SERPL-MCNC: 90 MG/DL (ref 7–18)
CALCIUM SERPL-MCNC: 8.5 MG/DL (ref 8.8–10.2)
CHLORIDE SERPL-SCNC: 104 MEQ/L (ref 98–107)
CO2 SERPL-SCNC: 28 MEQ/L (ref 21–32)
CREAT SERPL-MCNC: 2.43 MG/DL (ref 0.7–1.3)
ERYTHROCYTE [DISTWIDTH] IN BLOOD BY AUTOMATED COUNT: 21 % (ref 11.5–14.5)
GFR SERPL CREATININE-BSD FRML MDRD: 27.9 ML/MIN/{1.73_M2} (ref 42–?)
GLUCOSE SERPL-MCNC: 88 MG/DL (ref 83–110)
INR PPP: 2.27
MAGNESIUM SERPL-MCNC: 2 MG/DL (ref 1.8–2.4)
MCH RBC QN AUTO: 24.2 PG (ref 27–33)
MCHC RBC AUTO-ENTMCNC: 29.7 G/DL (ref 32–36.5)
MCV RBC AUTO: 81.6 FL (ref 80–96)
NRBC BLD AUTO-RTO: 0.2 % (ref 0–0)
PLATELET # BLD AUTO: 179 10^3/UL (ref 150–450)
POTASSIUM SERPL-SCNC: 3.4 MEQ/L (ref 3.5–5.1)
SODIUM SERPL-SCNC: 141 MEQ/L (ref 136–145)
WBC # BLD AUTO: 12.4 10^3/UL (ref 4–10)

## 2017-12-17 RX ADMIN — CALCITRIOL SCH MCG: 0.25 CAPSULE ORAL at 08:33

## 2017-12-17 RX ADMIN — DOCUSATE SODIUM,SENNOSIDES SCH TAB: 50; 8.6 TABLET, FILM COATED ORAL at 20:23

## 2017-12-17 RX ADMIN — BISOPROLOL FUMARATE SCH MG: 5 TABLET ORAL at 16:17

## 2017-12-17 RX ADMIN — GABAPENTIN SCH MG: 300 CAPSULE ORAL at 20:21

## 2017-12-17 RX ADMIN — DOCUSATE SODIUM,SENNOSIDES SCH TAB: 50; 8.6 TABLET, FILM COATED ORAL at 08:34

## 2017-12-17 RX ADMIN — ASPIRIN SCH MG: 81 TABLET ORAL at 20:20

## 2017-12-17 RX ADMIN — FUROSEMIDE SCH MG: 10 INJECTION, SOLUTION INTRAMUSCULAR; INTRAVENOUS at 20:20

## 2017-12-17 RX ADMIN — PRAVASTATIN SODIUM SCH MG: 20 TABLET ORAL at 20:21

## 2017-12-17 RX ADMIN — WARFARIN SODIUM SCH MG: 2.5 TABLET ORAL at 16:13

## 2017-12-17 RX ADMIN — DEXTROSE MONOHYDRATE SCH MG: 50 INJECTION, SOLUTION INTRAVENOUS at 08:54

## 2017-12-17 RX ADMIN — DOCUSATE SODIUM,SENNOSIDES SCH TAB: 50; 8.6 TABLET, FILM COATED ORAL at 20:21

## 2017-12-17 NOTE — IPNPDOC
Text Note


Date of Service


The patient was seen on 12/17/17.





NOTE


Subjective:


Patient seen and examined.  Patient denies any new medical complaints. States 

his breathing is improving. He is having cravings for cigarettes but states 

they are manageable.





Objective:


GENERAL:  NAD, lying comfortably in bed


HEENT:  NC/AT, EOMI


PULMONARY:  CTA B/L


CARDIAC:  2/6 systolic murmur, regular, +S1S2.


ABDOMEN:  Soft, NT, +BS, obese.


EXTREMITIES:  1+ bilateral lower extremity edema, with venous stasis skin


changes, right lower extremity, around the shin area with an ulcer with skin


breakdown with purulent drainage.


 


ASSESSMENT AND PLAN:  This is a 72-year-old male patient with underlying medical


history of congestive heart failure (CHF), with ejection fraction of 35%, 

chronic


kidney disease (CKD) stage IV, baseline creatinine 2.6, chronic atrial


fibrillation, on Coumadin, type 2 diabetes, diet controlled, hypertension,


presented to the emergency room with worsening lower extremity swelling and also


ulcer with purulent drainage.


 


#RLE venous stasis ulcers


- Case has been discussed with Dr. Stillerman.


- PT wound care has been consulted


- US negative for DVT


- as per wound care - Vashe and Optifoam dressing daily and leg elevation


- Continue diuresis as below


- Followup with Dr. Stillerman as outpatient


 


#Decompensated CHF


- systolic dysfunction, ejection fraction (EF) of 35%


- continue IV lasix - received metolazone x 1


- Telemetry monitoring.


- Followup renal function


- Follow as per cardiology - assistance appreciated


- strict I'O's, daily weight


- Continue statin, aspirin.





#Atrial fibrillation.  Continue beta blockers.  Anticoagulation with Coumadin.


Followup INR.


 


#Dyslipidemia.  Continue statin.


 


#Diabetes.  Diet controlled.  Followup glucose.


 


#Peripheral neuropathy.  Continue Neurontin.


 


#Hypertension


- pressures have been on the low side - likely discontinue coreg - discuss 

further with cardiology


 


#Deep venous thrombosis (DVT) prophylaxis.  Patient on Coumadin for atrial


fibrillation with therapeutic INR.





VS,Fishbone, I+O


VS, Fishbone, I+O


Laboratory Tests


12/17/17 05:52








Red Blood Count 4.79, Mean Corpuscular Volume 81.6, Mean Corpuscular Hemoglobin 

24.2 L, Mean Corpuscular Hemoglobin Concent 29.7 L, Red Cell Distribution Width 

21.0 H, Calcium Level 8.5 L








Vital Signs








  Date Time  Temp Pulse Resp B/P (MAP) Pulse Ox O2 Delivery O2 Flow Rate FiO2


 


12/17/17 08:56    90/60 (70)    


 


12/17/17 06:00 98.0 80 17  91 Nasal Cannula 3.0 

















KHADRA HERNANDEZ MD Dec 17, 2017 09:59

## 2017-12-18 VITALS — DIASTOLIC BLOOD PRESSURE: 68 MMHG | SYSTOLIC BLOOD PRESSURE: 126 MMHG

## 2017-12-18 VITALS — DIASTOLIC BLOOD PRESSURE: 72 MMHG | SYSTOLIC BLOOD PRESSURE: 117 MMHG

## 2017-12-18 VITALS — SYSTOLIC BLOOD PRESSURE: 113 MMHG | DIASTOLIC BLOOD PRESSURE: 58 MMHG

## 2017-12-18 VITALS — DIASTOLIC BLOOD PRESSURE: 63 MMHG | SYSTOLIC BLOOD PRESSURE: 114 MMHG

## 2017-12-18 VITALS — SYSTOLIC BLOOD PRESSURE: 116 MMHG | DIASTOLIC BLOOD PRESSURE: 55 MMHG

## 2017-12-18 VITALS — SYSTOLIC BLOOD PRESSURE: 133 MMHG | DIASTOLIC BLOOD PRESSURE: 71 MMHG

## 2017-12-18 VITALS — SYSTOLIC BLOOD PRESSURE: 125 MMHG | DIASTOLIC BLOOD PRESSURE: 73 MMHG

## 2017-12-18 VITALS — SYSTOLIC BLOOD PRESSURE: 127 MMHG | DIASTOLIC BLOOD PRESSURE: 68 MMHG

## 2017-12-18 LAB
ANION GAP SERPL CALC-SCNC: 6 MEQ/L (ref 8–16)
BUN SERPL-MCNC: 100 MG/DL (ref 7–18)
CALCIUM SERPL-MCNC: 9.3 MG/DL (ref 8.8–10.2)
CHLORIDE SERPL-SCNC: 103 MEQ/L (ref 98–107)
CO2 SERPL-SCNC: 31 MEQ/L (ref 21–32)
CREAT SERPL-MCNC: 2.35 MG/DL (ref 0.7–1.3)
ERYTHROCYTE [DISTWIDTH] IN BLOOD BY AUTOMATED COUNT: 21 % (ref 11.5–14.5)
GFR SERPL CREATININE-BSD FRML MDRD: 29 ML/MIN/{1.73_M2} (ref 42–?)
GLUCOSE SERPL-MCNC: 100 MG/DL (ref 83–110)
MAGNESIUM SERPL-MCNC: 2.1 MG/DL (ref 1.8–2.4)
MCH RBC QN AUTO: 24.3 PG (ref 27–33)
MCHC RBC AUTO-ENTMCNC: 30.1 G/DL (ref 32–36.5)
MCV RBC AUTO: 80.9 FL (ref 80–96)
NRBC BLD AUTO-RTO: 0 % (ref 0–0)
PLATELET # BLD AUTO: 203 10^3/UL (ref 150–450)
POTASSIUM SERPL-SCNC: 3.3 MEQ/L (ref 3.5–5.1)
SODIUM SERPL-SCNC: 140 MEQ/L (ref 136–145)
WBC # BLD AUTO: 12.8 10^3/UL (ref 4–10)

## 2017-12-18 RX ADMIN — ASPIRIN SCH MG: 81 TABLET ORAL at 22:15

## 2017-12-18 RX ADMIN — DOCUSATE SODIUM,SENNOSIDES SCH TAB: 50; 8.6 TABLET, FILM COATED ORAL at 09:45

## 2017-12-18 RX ADMIN — FUROSEMIDE SCH MG: 10 INJECTION, SOLUTION INTRAMUSCULAR; INTRAVENOUS at 22:43

## 2017-12-18 RX ADMIN — PRAVASTATIN SODIUM SCH MG: 20 TABLET ORAL at 22:15

## 2017-12-18 RX ADMIN — DOCUSATE SODIUM,SENNOSIDES SCH TAB: 50; 8.6 TABLET, FILM COATED ORAL at 22:15

## 2017-12-18 RX ADMIN — BISOPROLOL FUMARATE SCH MG: 5 TABLET ORAL at 09:44

## 2017-12-18 RX ADMIN — WARFARIN SODIUM SCH MG: 2.5 TABLET ORAL at 17:01

## 2017-12-18 RX ADMIN — CALCITRIOL SCH MCG: 0.25 CAPSULE ORAL at 09:45

## 2017-12-18 RX ADMIN — FUROSEMIDE SCH MG: 10 INJECTION, SOLUTION INTRAMUSCULAR; INTRAVENOUS at 09:44

## 2017-12-18 RX ADMIN — GABAPENTIN SCH MG: 300 CAPSULE ORAL at 22:16

## 2017-12-18 NOTE — IPNPDOC
Text Note


Date of Service


The patient was seen on 12/18/17.





NOTE


Subjective:


Patient seen and examined.  Patient denies any new medical complaints. 

Continues to feel better.





Objective:


GENERAL:  NAD, lying comfortably in bed


HEENT:  NC/AT, EOMI


PULMONARY:  CTA B/L


CARDIAC:  2-3/6 systolic murmur, regular, +S1S2.


ABDOMEN:  Soft, NT, +BS, obese.


EXTREMITIES:  1+ bilateral lower extremity edema, with venous stasis skin


changes, right lower extremity, around the shin area with an ulcer with skin


breakdown with purulent drainage.


 


ASSESSMENT AND PLAN:  This is a 72-year-old male patient with underlying medical


history of congestive heart failure (CHF), with ejection fraction of 35%, 

chronic


kidney disease (CKD) stage IV, baseline creatinine 2.6, chronic atrial


fibrillation, on Coumadin, type 2 diabetes, diet controlled, hypertension,


presented to the emergency room with worsening lower extremity swelling and also


ulcer with purulent drainage.


 


#RLE venous stasis ulcers


- Case has been discussed with Dr. Stillerman.


- PT wound care has been consulted


- US negative for DVT


- as per wound care - Vashe and Optifoam dressing daily and leg elevation


- Continue diuresis as below


- Followup with Dr. Stillerman as outpatient


 


#Decompensated CHF


- systolic dysfunction, ejection fraction (EF) of 35%


- continue IV lasix - dosage increased - received metolazone x 1


- Telemetry monitoring.


- Followup renal function


- Follow as per cardiology - assistance appreciated


- strict I'O's, daily weight


- Continue statin, aspirin.





#Atrial fibrillation


- Continue zebeta


- Anticoagulation with Coumadin - Followup INR.


 


#Dyslipidemia.  Continue statin.


 


#Diabetes.  Diet controlled.  Followup glucose.


 


#Peripheral neuropathy.  Continue Neurontin.


 


#Hypertension


- pressures improved - coreg was transitioned to zebeta


 


#Deep venous thrombosis (DVT) prophylaxis.  Patient on Coumadin for atrial


fibrillation with therapeutic INR.





VS,Fishbone, I+O


VS, Fishbone, I+O


Laboratory Tests


12/18/17 05:30








Red Blood Count 4.97, Mean Corpuscular Volume 80.9, Mean Corpuscular Hemoglobin 

24.3 L, Mean Corpuscular Hemoglobin Concent 30.1 L, Red Cell Distribution Width 

21.0 H, Calcium Level 9.3








Vital Signs








  Date Time  Temp Pulse Resp B/P (MAP) Pulse Ox O2 Delivery O2 Flow Rate FiO2


 


12/18/17 09:44  81  116/55    


 


12/18/17 06:00 97.4  18  94 Nasal Cannula 3.0 

















KHADRA HERNANDEZ MD Dec 18, 2017 11:01

## 2017-12-19 VITALS — SYSTOLIC BLOOD PRESSURE: 116 MMHG | DIASTOLIC BLOOD PRESSURE: 78 MMHG

## 2017-12-19 VITALS — SYSTOLIC BLOOD PRESSURE: 100 MMHG | DIASTOLIC BLOOD PRESSURE: 68 MMHG

## 2017-12-19 VITALS — DIASTOLIC BLOOD PRESSURE: 64 MMHG | SYSTOLIC BLOOD PRESSURE: 112 MMHG

## 2017-12-19 VITALS — DIASTOLIC BLOOD PRESSURE: 80 MMHG | SYSTOLIC BLOOD PRESSURE: 115 MMHG

## 2017-12-19 VITALS — SYSTOLIC BLOOD PRESSURE: 129 MMHG | DIASTOLIC BLOOD PRESSURE: 58 MMHG

## 2017-12-19 VITALS — DIASTOLIC BLOOD PRESSURE: 56 MMHG | SYSTOLIC BLOOD PRESSURE: 116 MMHG

## 2017-12-19 LAB
ANION GAP SERPL CALC-SCNC: 12 MEQ/L (ref 8–16)
ANION GAP SERPL CALC-SCNC: 4 MEQ/L (ref 8–16)
BUN SERPL-MCNC: 100 MG/DL (ref 7–18)
BUN SERPL-MCNC: 105 MG/DL (ref 7–18)
CALCIUM SERPL-MCNC: 8.8 MG/DL (ref 8.8–10.2)
CALCIUM SERPL-MCNC: 8.8 MG/DL (ref 8.8–10.2)
CHLORIDE SERPL-SCNC: 102 MEQ/L (ref 98–107)
CHLORIDE SERPL-SCNC: 102 MEQ/L (ref 98–107)
CO2 SERPL-SCNC: 27 MEQ/L (ref 21–32)
CO2 SERPL-SCNC: 34 MEQ/L (ref 21–32)
CREAT SERPL-MCNC: 2.25 MG/DL (ref 0.7–1.3)
CREAT SERPL-MCNC: 2.29 MG/DL (ref 0.7–1.3)
ERYTHROCYTE [DISTWIDTH] IN BLOOD BY AUTOMATED COUNT: 21.4 % (ref 11.5–14.5)
GFR SERPL CREATININE-BSD FRML MDRD: 29.9 ML/MIN/{1.73_M2} (ref 42–?)
GFR SERPL CREATININE-BSD FRML MDRD: 30.5 ML/MIN/{1.73_M2} (ref 42–?)
GLUCOSE SERPL-MCNC: 110 MG/DL (ref 83–110)
GLUCOSE SERPL-MCNC: 91 MG/DL (ref 83–110)
INR PPP: 1.82
MCH RBC QN AUTO: 24.1 PG (ref 27–33)
MCHC RBC AUTO-ENTMCNC: 30.1 G/DL (ref 32–36.5)
MCV RBC AUTO: 80.3 FL (ref 80–96)
NRBC BLD AUTO-RTO: 0.1 % (ref 0–0)
PLATELET # BLD AUTO: 177 10^3/UL (ref 150–450)
POTASSIUM SERPL-SCNC: 2.7 MEQ/L (ref 3.5–5.1)
POTASSIUM SERPL-SCNC: 3.3 MEQ/L (ref 3.5–5.1)
SODIUM SERPL-SCNC: 140 MEQ/L (ref 136–145)
SODIUM SERPL-SCNC: 141 MEQ/L (ref 136–145)
WBC # BLD AUTO: 13.9 10^3/UL (ref 4–10)

## 2017-12-19 RX ADMIN — DOCUSATE SODIUM,SENNOSIDES SCH TAB: 50; 8.6 TABLET, FILM COATED ORAL at 08:08

## 2017-12-19 RX ADMIN — POTASSIUM CHLORIDE SCH MLS/HR: 7.46 INJECTION, SOLUTION INTRAVENOUS at 10:28

## 2017-12-19 RX ADMIN — POTASSIUM CHLORIDE SCH MLS/HR: 7.46 INJECTION, SOLUTION INTRAVENOUS at 12:40

## 2017-12-19 RX ADMIN — SALINE NASAL SPRAY SCH SPRAY: 1.5 SOLUTION NASAL at 20:35

## 2017-12-19 RX ADMIN — ASPIRIN SCH MG: 81 TABLET ORAL at 20:35

## 2017-12-19 RX ADMIN — BISOPROLOL FUMARATE SCH MG: 5 TABLET ORAL at 08:05

## 2017-12-19 RX ADMIN — POTASSIUM CHLORIDE SCH MLS/HR: 7.46 INJECTION, SOLUTION INTRAVENOUS at 08:34

## 2017-12-19 RX ADMIN — FUROSEMIDE SCH MG: 10 INJECTION, SOLUTION INTRAMUSCULAR; INTRAVENOUS at 08:53

## 2017-12-19 RX ADMIN — DOCUSATE SODIUM,SENNOSIDES SCH TAB: 50; 8.6 TABLET, FILM COATED ORAL at 20:35

## 2017-12-19 RX ADMIN — GABAPENTIN SCH MG: 300 CAPSULE ORAL at 20:35

## 2017-12-19 RX ADMIN — PRAVASTATIN SODIUM SCH MG: 20 TABLET ORAL at 20:35

## 2017-12-19 RX ADMIN — CALCITRIOL SCH MCG: 0.25 CAPSULE ORAL at 08:06

## 2017-12-19 RX ADMIN — SALINE NASAL SPRAY SCH SPRAY: 1.5 SOLUTION NASAL at 08:06

## 2017-12-19 NOTE — IPN
DATE OF VISIT:  12/19/2017

 

SUBJECTIVE:  Patient is seen in the room today.  Patient still requires oxygen to

maintain satisfactory oxygen saturation.  Per patient, patient does not use any

oxygen at home.  I had a chance to talk to patient's wife.  Per family member,

patient has not been compliant with fluid restriction at home prior to the

hospitalization.  All of her questions are answered.

 

OBJECTIVE:

VITAL SIGNS:  Temperature 98, pulse is 77, respirations 18, blood pressure

116/78, pulse oximetry is 93% with 3 liter nasal cannula.

GENERAL:  No sign of acute distress.  Alert and oriented times three.

HEENT:  Normocephalic, atraumatic.

CARDIOVASCULAR:  Irregularly irregular.  Positive S1, S2.  Positive systolic

murmur.

LUNGS:  Positive crackles in the lung bilateral base.  No wheezes.

ABDOMEN:  Soft, nontender, nondistended, morbidly obese.

EXTREMITIES:  1+ edema in lower extremities.  Positive venous stasis skin

changes.  There is also oozing of the lower extremity most significant right

anterior shin cover with a foam dressing.  There is fluid noted on the dressing.

 

 

LABORATORY DATA:  WBC is 15.9, hemoglobin 11.9, hematocrit 39.6, platelet count

is 177.

 

Sodium is 140, potassium 2.7, chloride is 102, bicarbonate 34.  BUN is 105,

creatinine is 2.29.  GFR is 29.9.  Fasting glucose is 91.  Calcium is 8.8.

 

ASSESSMENT AND PLAN:

1.  Systolic congestive heart failure exacerbation.  Ejection fraction (EF) is

35%.  Currently, patient is on intravenous (IV) Lasix.  Cardiology, Dr. Figueredo

has been assisting on the case.  Appreciate his input.  Patient is currently on

fluid restriction.  Continue volume input and output and daily weight.

 

2.  Right lower extremity venous stasis ulcers.  The case was discussed with Dr. Stillerman previously.  Wound care is also consulted.  Patient currently using

Vashe and Optifoam dressing for the drainage.  Patient is also recommended to

continue leg elevation.  Currently, patient is on diuretics.

 

3.  Atrial fibrillation.  On Zebeta.  Patient has anticoagulation with Coumadin.

Today, patient had subtherapeutic international normalized ratio (INR).  Patient

has been taking warfarin 2.5 mg by mouth daily.  We will reverse the warfarin

dose.  Continue to follow with INR.

 

4.  Acute on chronic kidney injury.  Patient has been receiving diuresis.

Patient has a good urine output in the last 48 hours.  Renal function is

improving.  Continue to monitor.

 

5.  Hypokalemia.  Most likely secondary to diuretic use.  Patient will receive

potassium supplement.

 

6.  Dyslipidemia.  On pravastatin.

 

7.  Hypertension.  Blood pressure in the satisfactory range.  Patient is

encouraged to take his Zebeta.  Currently, patient is on IV Lasix.

 

8.  Peripheral neuropathy.  On Neurontin.

 

9.  Diabetes.  Diet controlled.  Fasting glucose in the morning has been in the

normal range.

 

10.  Deep venous thrombosis (DVT) prophylaxis.  Patient is currently on Coumadin

for his atrial fibrillation (AFib).

## 2017-12-20 VITALS — SYSTOLIC BLOOD PRESSURE: 116 MMHG | DIASTOLIC BLOOD PRESSURE: 59 MMHG

## 2017-12-20 VITALS — DIASTOLIC BLOOD PRESSURE: 60 MMHG | SYSTOLIC BLOOD PRESSURE: 105 MMHG

## 2017-12-20 VITALS — SYSTOLIC BLOOD PRESSURE: 108 MMHG | DIASTOLIC BLOOD PRESSURE: 72 MMHG

## 2017-12-20 VITALS — DIASTOLIC BLOOD PRESSURE: 71 MMHG | SYSTOLIC BLOOD PRESSURE: 114 MMHG

## 2017-12-20 VITALS — DIASTOLIC BLOOD PRESSURE: 60 MMHG | SYSTOLIC BLOOD PRESSURE: 134 MMHG

## 2017-12-20 VITALS — SYSTOLIC BLOOD PRESSURE: 137 MMHG | DIASTOLIC BLOOD PRESSURE: 83 MMHG

## 2017-12-20 VITALS — DIASTOLIC BLOOD PRESSURE: 78 MMHG | SYSTOLIC BLOOD PRESSURE: 126 MMHG

## 2017-12-20 LAB
ANION GAP SERPL CALC-SCNC: 9 MEQ/L (ref 8–16)
BUN SERPL-MCNC: 101 MG/DL (ref 7–18)
CALCIUM SERPL-MCNC: 8.5 MG/DL (ref 8.8–10.2)
CHLORIDE SERPL-SCNC: 102 MEQ/L (ref 98–107)
CO2 SERPL-SCNC: 30 MEQ/L (ref 21–32)
CREAT SERPL-MCNC: 2.23 MG/DL (ref 0.7–1.3)
ERYTHROCYTE [DISTWIDTH] IN BLOOD BY AUTOMATED COUNT: 21.8 % (ref 11.5–14.5)
GFR SERPL CREATININE-BSD FRML MDRD: 30.8 ML/MIN/{1.73_M2} (ref 42–?)
GLUCOSE SERPL-MCNC: 82 MG/DL (ref 83–110)
INR PPP: 1.81
MAGNESIUM SERPL-MCNC: 2 MG/DL (ref 1.8–2.4)
MCH RBC QN AUTO: 23.9 PG (ref 27–33)
MCHC RBC AUTO-ENTMCNC: 29.9 G/DL (ref 32–36.5)
MCV RBC AUTO: 79.9 FL (ref 80–96)
NRBC BLD AUTO-RTO: 0.2 % (ref 0–0)
PLATELET # BLD AUTO: 242 10^3/UL (ref 150–450)
POTASSIUM SERPL-SCNC: 3 MEQ/L (ref 3.5–5.1)
POTASSIUM SERPL-SCNC: 3.3 MEQ/L (ref 3.5–5.1)
SODIUM SERPL-SCNC: 141 MEQ/L (ref 136–145)
WBC # BLD AUTO: 13 10^3/UL (ref 4–10)

## 2017-12-20 RX ADMIN — DOCUSATE SODIUM,SENNOSIDES SCH TAB: 50; 8.6 TABLET, FILM COATED ORAL at 08:04

## 2017-12-20 RX ADMIN — CALCITRIOL SCH MCG: 0.25 CAPSULE ORAL at 08:04

## 2017-12-20 RX ADMIN — PRAVASTATIN SODIUM SCH MG: 20 TABLET ORAL at 20:50

## 2017-12-20 RX ADMIN — POTASSIUM CHLORIDE SCH MLS/HR: 7.46 INJECTION, SOLUTION INTRAVENOUS at 08:03

## 2017-12-20 RX ADMIN — DOCUSATE SODIUM,SENNOSIDES SCH TAB: 50; 8.6 TABLET, FILM COATED ORAL at 20:51

## 2017-12-20 RX ADMIN — SALINE NASAL SPRAY SCH SPRAY: 1.5 SOLUTION NASAL at 20:51

## 2017-12-20 RX ADMIN — GABAPENTIN SCH MG: 300 CAPSULE ORAL at 20:51

## 2017-12-20 RX ADMIN — POTASSIUM CHLORIDE SCH MEQ: 750 TABLET, EXTENDED RELEASE ORAL at 08:04

## 2017-12-20 RX ADMIN — ASPIRIN SCH MG: 81 TABLET ORAL at 20:51

## 2017-12-20 RX ADMIN — POTASSIUM CHLORIDE SCH MLS/HR: 7.46 INJECTION, SOLUTION INTRAVENOUS at 12:42

## 2017-12-20 RX ADMIN — SALINE NASAL SPRAY SCH SPRAY: 1.5 SOLUTION NASAL at 08:04

## 2017-12-20 RX ADMIN — POTASSIUM CHLORIDE SCH MLS/HR: 7.46 INJECTION, SOLUTION INTRAVENOUS at 09:50

## 2017-12-20 NOTE — IPNPDOC
Text Note


Date of Service


The patient was seen on 12/20/17.





NOTE


SUBJECTIVE:  Patient is seen in the room today.  Patient had nose bleed this 

morning. Last midnight there was a 64 beats of tachycardia. Per patient he was 

using the restroom and walking back to his bed when the tachycardia occurred. 


 


OBJECTIVE:


VITAL SIGNS:  Listed below. 


GENERAL:  No sign of acute distress.  Alert and oriented times three.


HEENT:  Normocephalic, atraumatic.


CARDIOVASCULAR:  Irregularly irregular.  Positive S1, S2.  Positive systolic


murmur.


LUNGS:  Positive crackles in the lung bilateral base.  No wheezes.


ABDOMEN:  Soft, nontender, nondistended, morbidly obese.


EXTREMITIES:  1+ edema in lower extremities.  Positive venous stasis skin


changes.  There is also oozing of the lower extremity most significant right


anterior shin cover with a foam dressing.  There is fluid noted on the dressing.


 


LABORATORY DATA:  Listed below. 


 


ASSESSMENT AND PLAN:


1.  Systolic congestive heart failure exacerbation.  Ejection fraction (EF) is


35%. Cardiology has been assisting on the case.  Appreciate his input.  Patient 

is currently on


fluid restriction.  Continue volume input and output and daily weight. Lasix is 

discontinued.    


 


2. Tachycardia. Strip is reviewed. It does not seem to be V tachy. Patient has 

severe hypokalemia. Continue to supplement through IV and PO. Lasix was 

discontinued. 





3.  Right lower extremity venous stasis ulcers.  The case was discussed with Dr. Stillerman previously.  Wound care is also consulted.  Patient currently using


Vashe and Optifoam dressing for the drainage.  Patient is also recommended to


continue leg elevation. 


 


4.  Atrial fibrillation.  On Zebeta.  Patient has anticoagulation with Coumadin.


Continue to adjust warfarin dosage as needed. Continue to follow with INR.


 


5.  Acute on chronic kidney injury.  Patient has been receiving diuresis.


Patient has a good urine output in the last 48 hours.  Renal function is


improving.  Continue to monitor.


 


6.  Hypokalemia.  Most likely secondary to diuretic use.  Patient will receive


potassium supplement.


 


6.  Dyslipidemia.  On pravastatin.


 


7.  Hypertension.  Blood pressure in the satisfactory range.  Patient is


encouraged to take his Zebeta.   


 


8.  Peripheral neuropathy.  On Neurontin.


 


9.  Diabetes.  Diet controlled.  Fasting glucose in the morning has been in the


normal range.


 


10.  Deep venous thrombosis (DVT) prophylaxis.  Patient is currently on Coumadin


for his atrial fibrillation (AFib).





VS,Fishbone, I+O


VS, Fishbone, I+O


Laboratory Tests


12/19/17 13:56








Calcium Level 8.8





12/20/17 00:58








12/20/17 05:51








Calcium Level 8.5 L, Red Blood Count 5.02, Mean Corpuscular Volume 79.9 L, Mean 

Corpuscular Hemoglobin 23.9 L, Mean Corpuscular Hemoglobin Concent 29.9 L, Red 

Cell Distribution Width 21.8 H








Vital Signs








  Date Time  Temp Pulse Resp B/P (MAP) Pulse Ox O2 Delivery O2 Flow Rate FiO2


 


12/20/17 11:31      Room Air  


 


12/20/17 06:00 97.5 75 19 105/60 (75) 96  3.0 














I&O- Last 24 Hours up to 6 AM 


 


 12/20/17





 06:00


 


Intake Total 720 ml


 


Output Total 3075 ml


 


Balance -2355 ml

















SANYA SÁNCHEZ DO Dec 20, 2017 13:22

## 2017-12-20 NOTE — IPN
DATE: 12/14/2017

 

Cardiology Progress Note

 

SUBJECTIVE:

The patient has been walking short distances in the ron.  Continues to be

limited by shortness of breath today.  Today he has had some lightheadedness and

visual disturbance, but has not fallen.  Remains free of any chest discomfort.

No awareness of his heart action.  He is happy that his lower leg swelling is

less.

 

OBJECTIVE:

Obese barrel-chested elderly male currently lying comfortably with the head of

bed elevated 20 degrees.  No apparent cyanosis centrally on supplemental oxygen

by nasal prongs at 3 liters per minute.  Heart rate 76 bpm and irregular, blood

pressure 90/50 supine, 95/50 standing, respiratory rate 18 per minute, O2

saturation 96%.  Weight today 296 pounds, BMI 38.  Curiously this weight is the

same as that he had on admission.  Despite a recorded negative fluid balance here

in hospital of 5 liters (this should be at least a 12-pound weight loss).

Trachea is midline.  Neck veins remain approximately 10 cm above the sternal

angle.  Increased anteroposterior chest diameter with bibasilar inspiratory

rales.  No expiratory rhonchi.  Apical impulse not palpable.  Heart sounds are

distant.  Has a variable systolic ejection murmur related to his

echocardiographically documented mild aortic stenosis.  Abdomen remains

protuberant but soft.  Could not rule out shifting dullness.  Continues to have

thickened somewhat discolored skin both lower legs related to chronic dependent

edema.  Still has at least 1 mm pitting edema one third up both sides size.  His

legs are currently not weeping.  Continues to wear a bandage on his right lower

leg.

 

TELEMETRY MONITOR:  This continues to show underlying atrial fibrillation with a

somewhat slow ventricular response given his relative hypotension.  Continues to

have isolated PVCs and ventricular triplets but no more sustained a ventricular

tachyarrhythmia.

 

I reviewed his chest CT scan from December 14, 2017 and this shows a moderately

dilated left atrium not mildly dilated, suspected from his echocardiogram his

left ventricle was upper limits of normal in size.  Right heart chamber sizes

were at least moderately to moderately severely dilated as was his inferior vena

cava and pulmonary trunk in keeping with severe pulmonary hypertension and right

heart failure.

 

LABORATORY DATA:

Hemoglobin 11.9 and this has been essentially stable since his admission, white

blood cell count is mildly elevated at 13.9 thousand up from 10.9 on admission.

Platelet count remains normal.  PT / INR this morning was 21.7/1.8, electrolytes

this morning show significant hypokalemia with potassium at 2.7 and bicarb of 34

with BUN up to 105 and creatinine 2.3 despite his hypokalemia and metabolic

alkalosis. His primary service did not discontinue his IV Lasix??

 

IMPRESSION/PLAN:

1.  Hypokalemia / metabolic alkalosis:  Induced by his aggressive parenteral

diuretic therapy and insufficient potassium chloride supplementation.  His IV

Lasix has been discontinued and he will receive a total of 120 mEq of KCl by

mouth.  His chemistry will be monitored closely and additional potassium chloride

will be administered as necessary.

 

2.  Heart failure (diastolic / acute on chronic):  Based on his observed

positional lightheadedness and blood pressure readings, he has reached his

effective dry weight despite obvious ongoing signs of right heart failure.  His

azotemia is reflecting the same.  As mentioned above his diuretic therapy will be

at least temporarily withheld and blood pressure and renal function will be

monitored.  Based on his left ventricular diastolic dysfunction and soft blood

pressure.  His Zebeta beta-blocker therapy will also be discontinued.

 

3. Cor pulmonale  / right heart failure:  His severe pulmonary hypertension and

right ventricular dysfunction prompted by his chronic obesity and obstructive

sleep apnea are chronic non fixable problems and determined his poor anticipated

prognosis.  Attempts to decrease his central venous pressure and reduced systemic

edema will not be tolerated by his right ventricle.  Reducing the prime to his

right ventricle at this point his the cause of his hypotension and azotemia.  I

have discussed the possibility of a retrial of C-PAP therapy and the patient is

quite reluctant.  He is willing to at least where supplemental oxygen

nocturnally.  I have written an order to check his O2 saturation with ambulation

to see whether this drops.  Should this be the case he should be wearing

continuous supplemental oxygen.

 

4.  Atrial fibrillation (chronic):  Clearly has some degree of conduction tissue

disease with a controlled ventricular response on relatively low dose bisoprolol

beta blocker therapy.  As mentioned above I have planned to discontinue this

agent for the time being anyway because of his soft blood pressure.  His right

ventricular function may actually improve with a somewhat faster heart rate.

Remains on oral anticoagulation.  Fortunately no evidence of bleeding.  Has been

free of any symptom or sign of embolic phenomenon.

 

5.  Coronary artery disease (native vessel):  Interestingly, despite his abnormal

EKG appearance recent echocardiogram showed his only a localized wall motion

abnormality is likely related to his severe pulmonary hypertension and right

ventricular pressure overload.  No evidence of inferior or anterior infarction.

At this point remains on oral anticoagulation and pravastatin.  His bisoprolol

therapy has been discontinued as mentioned, he is not a candidate for ACE

inhibition.

 

6.  Hypertensive heart disease (benign with heart failure):  Recent

echocardiogram did show at least mild concentric left ventricle hypertrophy with

preserved global resting left ventricular systolic function but evidence of an

impairment of LV diastolic function.  These findings are not likely to be

improved by a beta blocker therapy or ACE inhibition.  His current blood pressure

remained soft as mentioned above this would be inappropriate with his current

renal insufficiency.

 

7.  Obesity (BMI 38): Problem of some chronicity and contributing to his effort

intolerance, effort shortness of breath, left ventricular hypertrophy, his sleep

disorder and pulmonary hypertension.  Clearly significant weight loss would

improve his quality in light of life.  He has been encouraged to follow a low

carbohydrate diet.  A dietary consultation will be placed for precisely this.

 

I will continue to follow him with you.

## 2017-12-20 NOTE — IPN
DATE: 12/20/2017

 

Cardiology Progress Note

 

 

SUBJECTIVE:  Patient has been up in his room ambulating without chest discomfort
,

improved effort tolerance with less shortness of breath and no dizziness.  Has

been unaware of his heart action.

 

OBJECTIVE: Obese, barrel-chested elderly male currently lying comfortably with

the head of bed elevated 30 degrees.  Heart rate 84 bpm and irregular, blood

pressure 182/60 supine, 196/58 sitting with legs dependent, respiratory rate 18

per minute, O2 saturation 93% on nasal cannula 1 liter per minute.  No pallor or

central cyanosis.  Normal oral moisture.  Trachea midline.  Neck veins remain

significantly elevated approximately 10 cm above the sternal angle.  Increased

anteroposterior chest diameter with no current inspiratory rales.  Grant not

palpable.  Heart sounds unchanged.  Soft, protuberant abdomen.  Has obvious

pitting edema both lower extremities with up to the level of his mid thigh

posteriorly as well as over his sacrum.

 

TELEMETRY MONITOR:  Off his Zebeta his ventricular response to atrial

fibrillation has remained controlled.  Has occasional isolated PVCs and has had

several nonsustained runs of what somewhat slow monomorphic ventricular

tachycardia (up to 67 beats with rate 140 bpm).  Paced rhythm disturbances have

been asymptomatic.

 

LABORATORY DATA: Blood work today shows a hemoglobin of 12.  White blood cell

count of 13,000.  PT/INR was 21.6/1.8 despite considerable potassium replacement

yesterday.  His potassium this morning was only 3.3.  Was given additional by

mouth and IV potassium by the hospitalist and follow up potassium at three this

afternoon was up to 3.7.  Serum magnesium was normal at 2.0.  Other electrolytes

were also in balance.  BUN remains elevated at 101 and potassium remains 
elevated

but stable at 2.2.  Fasting glucose was 80 this morning.

 

IMPRESSION/PLAN:

 

1.  Nonsustained ventricular tachycardia:  Asymptomatic and somewhat slow, 
likely

related to his underlying heart disease and electrolyte disturbance.  Has

received considerable KCl replacement by mouth and IV, showing improvement this

afternoon.  We had a discussion today and the patient has an out hospital DNR. 
I 

have requested the nurses to obtain a copy of this for our hospital record.

 

2.  Hypokalemia / metabolic alkalosis:  This appears to be gradually improving

with replacement therapy.  Has received both by mouth and IV replacement earlier

today.  His diuretics remain on hold.

 

3.  Heart failure (diastolic / chronic):  As mentioned, he is at his functional

dry weight.  Has good air entry over both lung fields with no current 
inspiratory

rales despite his gross ongoing right heart failure.  I am somewhat confused by

blood pressure recordings on this gentleman with the automatic sphygmomanometer

as my manual blood pressures have been considerably lower.  He is now off 
Zebeta 

beta blocker therapy and diuretic is on hold.  There was obviously considerable 
error 

weighing this gentleman today; weight was recorded as 61.7 kg!!  (His weight 

December 15th was 134kg).

 

4.  Cor pulmonale  / right heart failure.  This is his most serious problem and 
anticipated 

limitation of his life expectancy.  His prognosis is measured in months.  

Despite my request to monitor and record his O2 saturation, at rest and with 

ambulation, this was not performed.  I have placed an order for the respiratory 
service to

do this instead of the nursing staff.

 

5.  Chronic atrial fibrillation: Clearly has degenerative

conduction tissue disease and abnormal AV eugenio function with a controlled

ventricular response off negative chronotropic therapy.  On his oral

anticoagulation has remained free of any symptom or sign of embolic phenomenon 
or

bleeding.  Hemoglobin is stable as mentioned.

 

6.  Coronary artery disease (native vessel):  On his current level of activity

has remained free of symptomatic myocardial ischemia.  Currently on statin

therapy and anticoagulation alone.

 

7.  Hypertensive heart disease (benign with heart failure).  Soft blood 
pressures

as mentioned above, diuretic therapy on hold.  Continues to have considerable

prerenal azotemia related to his impaired renal perfusion with his severe

pulmonary hypertension and low cardiac output.

 

Should his electrolyte imbalance be resolved by tomorrow, I would recommend a

home safety evaluation and possible discharge home.

SHERRELL

## 2017-12-21 VITALS — SYSTOLIC BLOOD PRESSURE: 118 MMHG | DIASTOLIC BLOOD PRESSURE: 76 MMHG

## 2017-12-21 VITALS — SYSTOLIC BLOOD PRESSURE: 123 MMHG | DIASTOLIC BLOOD PRESSURE: 63 MMHG

## 2017-12-21 VITALS — SYSTOLIC BLOOD PRESSURE: 129 MMHG | DIASTOLIC BLOOD PRESSURE: 69 MMHG

## 2017-12-21 VITALS — DIASTOLIC BLOOD PRESSURE: 54 MMHG | SYSTOLIC BLOOD PRESSURE: 107 MMHG

## 2017-12-21 VITALS — DIASTOLIC BLOOD PRESSURE: 67 MMHG | SYSTOLIC BLOOD PRESSURE: 115 MMHG

## 2017-12-21 VITALS — SYSTOLIC BLOOD PRESSURE: 130 MMHG | DIASTOLIC BLOOD PRESSURE: 83 MMHG

## 2017-12-21 LAB
ANION GAP SERPL CALC-SCNC: 7 MEQ/L (ref 8–16)
BUN SERPL-MCNC: 92 MG/DL (ref 7–18)
CALCIUM SERPL-MCNC: 9.1 MG/DL (ref 8.8–10.2)
CHLORIDE SERPL-SCNC: 105 MEQ/L (ref 98–107)
CO2 SERPL-SCNC: 30 MEQ/L (ref 21–32)
CREAT SERPL-MCNC: 2 MG/DL (ref 0.7–1.3)
ERYTHROCYTE [DISTWIDTH] IN BLOOD BY AUTOMATED COUNT: 22 % (ref 11.5–14.5)
GFR SERPL CREATININE-BSD FRML MDRD: 34.9 ML/MIN/{1.73_M2} (ref 42–?)
GLUCOSE SERPL-MCNC: 87 MG/DL (ref 83–110)
INR PPP: 1.8
MCH RBC QN AUTO: 24.4 PG (ref 27–33)
MCHC RBC AUTO-ENTMCNC: 30.1 G/DL (ref 32–36.5)
MCV RBC AUTO: 81.1 FL (ref 80–96)
NRBC BLD AUTO-RTO: 0 % (ref 0–0)
PLATELET # BLD AUTO: 202 10^3/UL (ref 150–450)
POTASSIUM SERPL-SCNC: 3.9 MEQ/L (ref 3.5–5.1)
SODIUM SERPL-SCNC: 142 MEQ/L (ref 136–145)
WBC # BLD AUTO: 11.6 10^3/UL (ref 4–10)

## 2017-12-21 RX ADMIN — CALCITRIOL SCH MCG: 0.25 CAPSULE ORAL at 07:56

## 2017-12-21 RX ADMIN — GABAPENTIN SCH MG: 300 CAPSULE ORAL at 21:08

## 2017-12-21 RX ADMIN — PRAVASTATIN SODIUM SCH MG: 20 TABLET ORAL at 21:09

## 2017-12-21 RX ADMIN — SALINE NASAL SPRAY SCH SPRAY: 1.5 SOLUTION NASAL at 07:57

## 2017-12-21 RX ADMIN — DOCUSATE SODIUM,SENNOSIDES SCH TAB: 50; 8.6 TABLET, FILM COATED ORAL at 21:00

## 2017-12-21 RX ADMIN — POTASSIUM CHLORIDE SCH MEQ: 750 TABLET, EXTENDED RELEASE ORAL at 07:56

## 2017-12-21 RX ADMIN — DOCUSATE SODIUM,SENNOSIDES SCH TAB: 50; 8.6 TABLET, FILM COATED ORAL at 07:56

## 2017-12-21 RX ADMIN — SALINE NASAL SPRAY SCH SPRAY: 1.5 SOLUTION NASAL at 21:09

## 2017-12-21 RX ADMIN — ASPIRIN SCH MG: 81 TABLET ORAL at 21:00

## 2017-12-21 NOTE — IPN
CARDIOLOGY PROGRESS NOTE: 12/21/2017

 

SUBJECTIVE: Patient has been up ambulating short distances in his room without

chest discomfort or dizziness.  Continues to be quite dyspneic with minimal

activity.  Remains unaware of his heart action.  He is very keen to be discharged

home at this time.

 

OBJECTIVE: Obese, barrel-chested elderly male currently sitting on the edge of

the bed comfortably.  Heart rate 94 bpm and irregular, blood pressure 120/64

sitting, respiratory rate 20, O2 saturation 91% on room air.  Note is made of his

O2 desaturation ambulating short distances.  He will need chronic home O2.  Neck

veins remain markedly elevated.  Increased anteroposterior chest diameter with no

inspiratory rales.  Protuberant, obese abdomen that is soft, obvious very

impressive lower extremity and sacral pitting edema.

 

LABORATORY DATA: Blood work today showed a hemoglobin of 12.5 which is stable,

white blood cell count is decreased from yesterday at 11.6 thousand. Normal

platelet count.  His PT / INR is 21.5 / 1.8 on his increased Coumadin therapy.

Electrolytes this morning show a potassium finally up to 3.9.  Other electrolytes

were normal.  BUN has decreased to 92, creatinine to 2.0, fasting glucose was

87.

 

IMPRESSION/PLAN:

1.  Nonsustained ventricular tachycardia:  Has remained free of any symptomatic

arrhythmia.  Obviously, potassium replacement was a key measure to prevent

further malignant arrhythmia.  However, with his advanced / end-stage right heart

failure his prognosis is guarded.  We certainly respect his wish to be DO NOT

RESUSCITATE (DNR).  We have emphasized the importance of supplemental oxygen by

nasal prongs with ambulation to prevent effort related O2 desaturation and

further tachyarrhythmia.  The patient appears to understand and he is agreeable.

 

2.  Hypokalemia:  Fortunately, this problem has finally improved with impressive

potassium replacement therapy.  I anticipate it will be safe for us to resume a

lower dose of his torsemide i.e. 40 mg daily tomorrow along with his potassium

chloride 40 mEq daily.  I have also started him on low-dose spirolactone 12.5 mg

daily.  We will arrange for him to have followup renal profile the day prior to

his office followup appointment with us in 7 to 10 days.

 

3.  Heart failure (diastolic / chronic):  From the left ventricular standpoint,

he appears to be compensated off diuretic therapy for the time being because of

his relative hypotension and azotemia (severe right heart failure being the

limitation to further diuresis).  Hopefully tomorrow morning, we will be able to

resume his torsemide 40 mg daily with low-dose spironolactone and potassium

chloride 40 mEq daily.  We have emphasized the crucial importance of modest salt

and fluid intake restriction.

 

4.  Cor pulmonale / right heart failure:  Demonstrated O2 desaturation with even

minimal activity clearly justifies chronic home O2.  Recognizing the chief cause

of his severe pulmonary hypertension has been his obesity and obstructive sleep

apnea.  The patient appears more willing today to be referred back to pulmonary

medicine for pressure therapy.  Weight loss and his C-PAP will clearly improve

his quality and length of life.

 

5.  Chronic atrial fibrillation:  Controlled ventricular response off negative

chronotropic therapy.  I would not send him home on his Zebeta.  Remains on oral

anticoagulation, which has been adjusted by the hospitalist service.  I believe

his dosage on discharge should be 2.5 mg daily except for 5 mg Mondays,

Wednesdays and Fridays.  His primary physician, Dr. Cotter will determine who is

currently managing his Coumadin but our service would be pleased to monitor this

and would plan a followup PT / INR the day prior to his followup appointment with

us in 7-10 days.

 

6.  Coronary disease (native vessel):  Remains free of symptomatic myocardial

ischemia.  Currently on statin and anticoagulation therapy alone.  Not a

candidate for beta blocker or ACE inhibitor.

 

7.  Hypertensive heart disease (benign with heart failure):  Current blood

pressure has improved holding his diuretic therapy and stopping his Zebeta.  I

suspect his right sided filling pressure has increased accounting for improved

right ventricular output and supply to his left ventricle with improved left

ventricular cardiac output and renal perfusion.  Plan is to resume torsemide with

low-dose spirolactone tomorrow.

 

The patient is aware of his limited prognosis and appears to be more receptive to

following dietary restrictions, chronic home O2 therapy and pulmonary service

reevaluation for C-PAP therapy.  Cardiac medications should include:  Torsemide

20 mEq tablets 2 tablets daily, potassium chloride 20 mEq tablets 2 tablets

daily, spironolactone 25 mg tablets 1/2 tablet daily, Coumadin 2.5 mg daily

except for 5 mg Mondays, Wednesdays and Fridays, pravastatin 80 mg at bedtime and

aspirin 81 mg daily.  Recommended a followup clinic visit / CHF check in our

office in 7-10 days time with some blood draw for PT / INR and renal profile the

day prior to his visit.  I have discussed this with Dr. Cotter this evening.  The

patient has indicated his wish to have his wife and daughter come to his

appointment to learn more about his guarded prognosis and advanced

cardiopulmonary condition.  We would encourage him to contact us should he have

problems at home.

## 2017-12-21 NOTE — IPNPDOC
Text Note


Date of Service


The patient was seen on 12/21/17.





NOTE


SUBJECTIVE:  Patient is seen in the room today.  Patient has acute oxygen 

desaturation during ambulation in room air. He has been on O2 supplement since 

admission. He does not use oxygen at home. 


 


OBJECTIVE:


VITAL SIGNS:  Listed below. 


GENERAL:  No sign of acute distress.  Alert and oriented times three.


HEENT:  Normocephalic, atraumatic.


CARDIOVASCULAR:  Irregularly irregular.  Positive S1, S2.  Positive systolic


murmur.


LUNGS:  Positive crackles in the lung bilateral base.  No wheezes.


ABDOMEN:  Soft, nontender, nondistended, morbidly obese.


EXTREMITIES:  1+ edema in lower extremities.  Positive venous stasis skin


changes.  There is also oozing of the lower extremity most significant right


anterior shin cover with a foam dressing.  There is fluid noted on the dressing.


 


LABORATORY DATA:  Listed below. 


 


ASSESSMENT AND PLAN:


1.  Systolic congestive heart failure exacerbation.  Ejection fraction (EF) is


35%. Cardiology has been assisting on the case.  Appreciate his input.  Patient 

is currently on


fluid restriction.  Continue volume input and output and daily weight. Lasix is 

discontinued. 


- Patient has acute oxygen desaturation during physical therapy section. Will 

continue to titrate oxygen as tolerated. If patient continue requiring oxygen 

during ambulation, will consider discharge patient with short term portable 

oxygen.    


- 


 


2. Tachycardia. Cardiology has been assisting on the case. Patient has severe 

hypokalemia. Continue to supplement through IV and PO. Lasix was discontinued. 





3.  Right lower extremity venous stasis ulcers.  The case was discussed with Dr. Stillerman previously.  Wound care is also consulted.  Patient currently using


Vashe and Optifoam dressing for the drainage.  Patient is also recommended to


continue leg elevation. 


 


4.  Atrial fibrillation.  On Zebeta.  Patient has anticoagulation with Coumadin.


Continue to adjust warfarin dosage as needed. Continue to follow with INR.


 


5.  Acute on chronic kidney injury.  Patient has been receiving diuresis 

previously.  Renal function is


improving.  Continue to monitor.


 


6.  Hypokalemia.  Most likely secondary to diuretic use.  Patient will receive


potassium supplement.


 


6.  Dyslipidemia.  On pravastatin.


 


7.  Hypertension.  Blood pressure in the satisfactory range.  Patient is


encouraged to take his Zebeta.   


 


8.  Peripheral neuropathy.  On Neurontin.


 


9.  Diabetes.  Diet controlled.  Fasting glucose in the morning has been in the


normal range.


 


10.  Deep venous thrombosis (DVT) prophylaxis.  Patient is currently on Coumadin


for his atrial fibrillation (AFib).





VS,Fishbone, I+O


VS, Fishbone, I+O


Laboratory Tests


12/20/17 14:58








12/21/17 07:40








Red Blood Count 5.12, Mean Corpuscular Volume 81.1, Mean Corpuscular Hemoglobin 

24.4 L, Mean Corpuscular Hemoglobin Concent 30.1 L, Red Cell Distribution Width 

22.0 H, Calcium Level 9.1








Vital Signs








  Date Time  Temp Pulse Resp B/P (MAP) Pulse Ox O2 Delivery O2 Flow Rate FiO2


 


12/21/17 10:21      Nasal Cannula 2.0 


 


12/21/17 10:00 97.3 97 18 115/67 (74) 93   














I&O- Last 24 Hours up to 6 AM 


 


 12/21/17





 06:00


 


Intake Total 840 ml


 


Output Total 650 ml


 


Balance 190 ml

















SANYA SÁNCHEZ DO Dec 21, 2017 14:10

## 2017-12-22 VITALS — SYSTOLIC BLOOD PRESSURE: 105 MMHG | DIASTOLIC BLOOD PRESSURE: 56 MMHG

## 2017-12-22 VITALS — SYSTOLIC BLOOD PRESSURE: 111 MMHG | DIASTOLIC BLOOD PRESSURE: 66 MMHG

## 2017-12-22 VITALS — SYSTOLIC BLOOD PRESSURE: 118 MMHG | DIASTOLIC BLOOD PRESSURE: 56 MMHG

## 2017-12-22 VITALS — SYSTOLIC BLOOD PRESSURE: 103 MMHG | DIASTOLIC BLOOD PRESSURE: 51 MMHG

## 2017-12-22 LAB
ANION GAP SERPL CALC-SCNC: 9 MEQ/L (ref 8–16)
BUN SERPL-MCNC: 84 MG/DL (ref 7–18)
CALCIUM SERPL-MCNC: 9.1 MG/DL (ref 8.8–10.2)
CHLORIDE SERPL-SCNC: 107 MEQ/L (ref 98–107)
CO2 SERPL-SCNC: 25 MEQ/L (ref 21–32)
CREAT SERPL-MCNC: 1.84 MG/DL (ref 0.7–1.3)
ERYTHROCYTE [DISTWIDTH] IN BLOOD BY AUTOMATED COUNT: 22.2 % (ref 11.5–14.5)
GFR SERPL CREATININE-BSD FRML MDRD: 38.5 ML/MIN/{1.73_M2} (ref 42–?)
GLUCOSE SERPL-MCNC: 83 MG/DL (ref 83–110)
INR PPP: 2.14
MCH RBC QN AUTO: 24.4 PG (ref 27–33)
MCHC RBC AUTO-ENTMCNC: 29.7 G/DL (ref 32–36.5)
MCV RBC AUTO: 82.3 FL (ref 80–96)
NRBC BLD AUTO-RTO: 0 % (ref 0–0)
PLATELET # BLD AUTO: 207 10^3/UL (ref 150–450)
POTASSIUM SERPL-SCNC: 4.2 MEQ/L (ref 3.5–5.1)
SODIUM SERPL-SCNC: 141 MEQ/L (ref 136–145)
WBC # BLD AUTO: 12.2 10^3/UL (ref 4–10)

## 2017-12-22 RX ADMIN — CALCITRIOL SCH MCG: 0.25 CAPSULE ORAL at 08:27

## 2017-12-22 RX ADMIN — SALINE NASAL SPRAY SCH SPRAY: 1.5 SOLUTION NASAL at 08:28

## 2017-12-22 RX ADMIN — DOCUSATE SODIUM,SENNOSIDES SCH TAB: 50; 8.6 TABLET, FILM COATED ORAL at 08:27

## 2017-12-22 RX ADMIN — POTASSIUM CHLORIDE SCH MEQ: 750 TABLET, EXTENDED RELEASE ORAL at 08:27

## 2017-12-22 NOTE — DSES
DATE OF ADMISSION:  12/14/2017

DATE OF DISCHARGE:

 

CONSULTANTS:  Cardiologist.

 

PRIMARY CARE PROVIDER:  Dr. Sapp

 

PROCEDURES:  None.

 

DISCHARGE DIAGNOSES:

1.  Diastolic heart failure exacerbation.

2.  Atrial fibrillation.

3.  Acute on chronic kidney injury.

4.  Nonsustained ventricular tachycardia.

5.  Cor pulmonale.

6.  Coronary artery disease.

7.  Hypertensive heart disease.

8.  Right lower extremity venous stasis ulcers.

9.  Hypokalemia.

10.  Hypertension.

11.  Peripheral neuropathy.

12.  Diabetes.

13.  Obstructive sleep apnea (YUSEF), intolerable to continuous positive airway

pressure (CPAP).

 

HOSPITALIZATION COURSE:  The patient is a 72-year-old male who presented to

[facility] on 12/14/2017, for worsening of bilateral lower

extremity swelling.  Patient is admitted for heart failure exacerbation.

Cardiologist consulted.  A 2D echo is also consulted.  For the right lower

extremity venous stasis ulcer, wound care specialist Dr. Stillerman is contacted

and recommended ____________________ through the communications.  With IV

diuretic, patient's respiratory status is improving.  Patient's lower extremity

swelling also shows gradual improvement.  Later, patient was noted to have

nonsustained ventricular tachycardia on telemetry, therefore the diuretic was

placed on hold.  Fortunately, at the time of the incident, patient's fluid

balance is approaching his baseline.  Patient's medication continued to be

adjusted.  Patient continued to work with physical therapy and it was noted

patient has good oxygen saturation at rest, however during any type of exertion,

patient has acute desaturation of oxygenation.  On 12/22/2017, patient is

determined to be medically stable for discharge, with recommendation to strictly

follow the fluid restriction as instructed, patient should never skip his

medications, and patient should establish and followup with pulmonology for his

CPAP.  Patient is instructed to followup with wound care specialist for his

venous stasis ulcers.  Patient should have close followup with primary care

provider and cardiology.

 

OBJECTIVE:

VITAL SIGNS:  On the day of discharge:  Temperature 98.2, pulse 92, respirations

21, blood pressure 111/66, pulse oximetry 97% with 4 liters nasal cannula.

 

LABORATORY DATA:

WBC 12.2, hemoglobin 13, hematocrit 43.8, platelet count 207, sodium 141,

potassium 4.2, chloride 107, carbon dioxide 25, BUN 84, creatinine 1.84, GFR

38.5, fasting glucose 83, calcium 9.1, PT is 24.7, INR is 2.14.

 

Blood cultures from 12/14/2017, is negative after 5 days.

 

IMAGING STUDIES:

Chest x-ray on 12/14/2017, shows congestive heart failure (CHF).

 

CT of the chest without contrast on 12/14/2017, shows cardiomegaly, CHF, right

pleural effusion, sequelae of previous granulomatous disease, evidence of

pulmonary fibrosis, mild scattered emphysema.

 

Renal ultrasound on 12/15/2017, shows negative renal ultrasound, multiple left

renal cysts.

 

DISCHARGE MEDICATIONS:

- potassium chloride 40 mEq by mouth daily for 7 days

- spironolactone 12.5 mg by mouth every morning

- torsemide 40 mg by mouth daily

- warfarin 2.5 mg by mouth nightly on Tuesday, Thursday, Saturday, and Sunday

- warfarin 5 mg by mouth on Monday, Wednesday, and Friday

- allopurinol 400 mg by mouth daily

- aspirin 81 mg by mouth nightly

- calcitriol 0.25 mcg by mouth daily

- carvedilol 12.5 mg by mouth twice a day

- Flonase 100 mcg by mouth daily as needed

- gabapentin 300 mg by mouth nightly

- pravastatin 80 mg by mouth nightly

- Ambien 10 mg by mouth nightly

 

DISCHARGE INSTRUCTIONS:

Discontinue lines.  Discharge home.  Ambulate with a walker.  Patient will use

portable oxygen for any type of exertion.  Patient should follow with 1.5 liters

fluid restriction, measure daily weight.  No salt diet as tolerated.  Patient

should followup with primary care provider Dr. Sapp in 1-2 weeks.  Patient should

followup with Dr. Hay in 7-10 days.  Patient should get laboratory tests,

including CBC, renal profile, and PT/INR prior to Dr. Hay' office visit.

Patient is recommended to followup with Dr. Stillerman for the chronic venous

stasis wound care.

 

DISCHARGE TIME:  Greater than 30 minutes.

 

DISCHARGE CONDITION:  Fair.

## 2017-12-23 NOTE — ECGEPIP
Stationary ECG Study

                              [facility]

                                       

                                       Test Date:    2017

Pat Name:     FEI CAMACHO           Department:   

Patient ID:   [id number]                 Room:         -92

Gender:       M                        Technician:   KALEIGH

:          1945               Requested By: Sly Hay 

Order Number: [id number]     Reading MD:   Misbah Bhatti

                                 Measurements

Intervals                              Axis          

Rate:         94                       P:            

NM:           0                        QRS:          155

QRSD:         122                      T:            -1

QT:           372                                    

QTc:          466                                    

                           Interpretive Statements

ATRIAL FIBRILLATION WITH ABERRANT CONDUCTION OR VENTRICULAR PREMATURE

COMPLEXES

Incomplete Right bundle branch block

POSSIBLE ANTERIOR MYOCARDIAL INFARCTION, PROBABLY OLD

POSSIBLE INFERIOR MYOCARDIAL INFARCTION, PROBABLY OLD

Similar to tracing done 2017

Electronically Signed On 2017 1:16:12 EST by Misbah Bhatti

## 2018-02-14 ENCOUNTER — HOSPITAL ENCOUNTER (OUTPATIENT)
Dept: HOSPITAL 53 - M SLEEP | Age: 73
End: 2018-02-14
Attending: PHYSICIAN ASSISTANT
Payer: MEDICARE

## 2018-02-14 DIAGNOSIS — R09.02: Primary | ICD-10-CM

## 2018-02-14 PROCEDURE — 95811 POLYSOM 6/>YRS CPAP 4/> PARM: CPT

## 2018-02-15 ENCOUNTER — HOSPITAL ENCOUNTER (OUTPATIENT)
Dept: HOSPITAL 53 - M SFHCCAPE | Age: 73
End: 2018-02-15
Attending: PHYSICIAN ASSISTANT
Payer: MEDICARE

## 2018-02-15 DIAGNOSIS — L02.219: Primary | ICD-10-CM

## 2018-02-15 DIAGNOSIS — L03.319: ICD-10-CM

## 2018-02-15 PROCEDURE — 87076 CULTURE ANAEROBE IDENT EACH: CPT

## 2018-02-18 ENCOUNTER — HOSPITAL ENCOUNTER (EMERGENCY)
Dept: HOSPITAL 53 - M ED | Age: 73
Discharge: HOME | End: 2018-02-18
Payer: MEDICARE

## 2018-02-18 DIAGNOSIS — E78.5: ICD-10-CM

## 2018-02-18 DIAGNOSIS — I50.9: ICD-10-CM

## 2018-02-18 DIAGNOSIS — I48.91: Primary | ICD-10-CM

## 2018-02-18 DIAGNOSIS — Z79.01: ICD-10-CM

## 2018-02-18 DIAGNOSIS — N18.9: ICD-10-CM

## 2018-02-18 DIAGNOSIS — K76.89: ICD-10-CM

## 2018-02-18 DIAGNOSIS — Z79.899: ICD-10-CM

## 2018-02-18 DIAGNOSIS — I10: ICD-10-CM

## 2018-02-18 DIAGNOSIS — I51.7: ICD-10-CM

## 2018-02-18 DIAGNOSIS — Z79.82: ICD-10-CM

## 2018-02-18 DIAGNOSIS — E11.9: ICD-10-CM

## 2018-02-18 LAB
ALBUMIN/GLOBULIN RATIO: 0.92 (ref 1–1.93)
ALBUMIN: 3.5 GM/DL (ref 3.2–5.2)
ALKALINE PHOSPHATASE: 113 U/L (ref 45–117)
ALT SERPL W P-5'-P-CCNC: 15 U/L (ref 12–78)
ANION GAP: 12 MEQ/L (ref 8–16)
AST SERPL-CCNC: 12 U/L (ref 7–37)
BASO #: 0.1 10^3/UL (ref 0–0.2)
BASO %: 0.5 % (ref 0–1)
BILIRUB CONJ SERPL-MCNC: 0.7 MG/DL (ref 0–0.2)
BILIRUBIN,TOTAL: 1.2 MG/DL (ref 0.2–1)
BLOOD UREA NITROGEN: 71 MG/DL (ref 7–18)
CALCIUM LEVEL: 8.5 MG/DL (ref 8.8–10.2)
CARBON DIOXIDE LEVEL: 26 MEQ/L (ref 21–32)
CHLORIDE LEVEL: 104 MEQ/L (ref 98–107)
CK MB CFR.DF SERPL CALC: 1.94
CK MB CFR.DF SERPL CALC: 5.67
CK SERPL-CCNC: 108 U/L (ref 39–308)
CK SERPL-CCNC: 37 U/L (ref 39–308)
CK-MB VALUE MASS: 2.1 NG/ML (ref 0–3.6)
CK-MB VALUE MASS: 2.1 NG/ML (ref 0–3.6)
CREATININE FOR GFR: 2.1 MG/DL (ref 0.7–1.3)
EOS #: 0.1 10^3/UL (ref 0–0.5)
EOSINOPHIL NFR BLD AUTO: 0.9 % (ref 0–3)
FREE T4: 1.36 NG/DL (ref 0.76–1.46)
GFR SERPL CREATININE-BSD FRML MDRD: 33 ML/MIN/{1.73_M2} (ref 42–?)
GLUCOSE, FASTING: 97 MG/DL (ref 70–100)
HEMATOCRIT: 43 % (ref 42–52)
HEMOGLOBIN: 12.9 G/DL (ref 14–18)
IMMATURE GRANULOCYTE %: 0.6 % (ref 0–3)
INR: 1.91
LYMPH #: 0.8 10^3/UL (ref 1.5–4.5)
LYMPH %: 7 % (ref 24–44)
MEAN CORPUSCULAR HEMOGLOBIN: 25 PG (ref 27–33)
MEAN CORPUSCULAR HGB CONC: 30 G/DL (ref 32–36.5)
MEAN CORPUSCULAR VOLUME: 83.2 FL (ref 80–96)
MONO #: 0.7 10^3/UL (ref 0–0.8)
MONO %: 6.3 % (ref 0–5)
NEUTROPHILS #: 9.1 10^3/UL (ref 1.8–7.7)
NEUTROPHILS %: 84.7 % (ref 36–66)
NRBC BLD AUTO-RTO: 0 % (ref 0–0)
NT-PRO BNP: 5432 PG/ML (ref ?–125)
PARTIAL THROMBOPLASTIN TIME: 33.3 SECONDS (ref 26.8–37.9)
PLATELET COUNT, AUTOMATED: 200 10^3/UL (ref 150–450)
POTASSIUM SERUM: 3.9 MEQ/L (ref 3.5–5.1)
PROTHROMBIN TIME: 22.5 SECONDS (ref 12.4–14.5)
RED BLOOD COUNT: 5.17 10^6/UL (ref 4.3–6.1)
RED CELL DISTRIBUTION WIDTH: 22.3 % (ref 11.5–14.5)
SODIUM LEVEL: 142 MEQ/L (ref 136–145)
THYROID STIMULATING HORMONE: 3.53 UIU/ML (ref 0.36–3.74)
TOTAL PROTEIN: 7.3 GM/DL (ref 6.4–8.2)
TROPONIN I: 0.02 NG/ML (ref ?–0.1)
TROPONIN I: 0.02 NG/ML (ref ?–0.1)
WHITE BLOOD COUNT: 10.8 10^3/UL (ref 4–10)

## 2018-02-18 RX ADMIN — ONDANSETRON 1 MG: 2 INJECTION INTRAMUSCULAR; INTRAVENOUS at 17:16

## 2018-02-22 ENCOUNTER — HOSPITAL ENCOUNTER (INPATIENT)
Dept: HOSPITAL 53 - M ED | Age: 73
LOS: 2 days | Discharge: HOSPICE | DRG: 291 | End: 2018-02-24
Attending: HOSPITALIST | Admitting: INTERNAL MEDICINE
Payer: MEDICARE

## 2018-02-22 DIAGNOSIS — E66.01: ICD-10-CM

## 2018-02-22 DIAGNOSIS — I50.33: ICD-10-CM

## 2018-02-22 DIAGNOSIS — I25.10: ICD-10-CM

## 2018-02-22 DIAGNOSIS — Z79.899: ICD-10-CM

## 2018-02-22 DIAGNOSIS — I27.81: ICD-10-CM

## 2018-02-22 DIAGNOSIS — I13.0: Primary | ICD-10-CM

## 2018-02-22 DIAGNOSIS — Z87.891: ICD-10-CM

## 2018-02-22 DIAGNOSIS — E11.22: ICD-10-CM

## 2018-02-22 DIAGNOSIS — N18.4: ICD-10-CM

## 2018-02-22 DIAGNOSIS — Z79.82: ICD-10-CM

## 2018-02-22 DIAGNOSIS — Z79.01: ICD-10-CM

## 2018-02-22 DIAGNOSIS — G47.33: ICD-10-CM

## 2018-02-22 DIAGNOSIS — I48.91: ICD-10-CM

## 2018-02-22 DIAGNOSIS — I87.2: ICD-10-CM

## 2018-02-22 DIAGNOSIS — Z66: ICD-10-CM

## 2018-02-22 DIAGNOSIS — I50.84: ICD-10-CM

## 2018-02-22 LAB
ANION GAP: 8 MEQ/L (ref 8–16)
BASO #: 0 10^3/UL (ref 0–0.2)
BASO %: 0.1 % (ref 0–1)
BLOOD UREA NITROGEN: 87 MG/DL (ref 7–18)
CALCIUM LEVEL: 8.2 MG/DL (ref 8.8–10.2)
CARBON DIOXIDE LEVEL: 26 MEQ/L (ref 21–32)
CHLORIDE LEVEL: 104 MEQ/L (ref 98–107)
CK MB CFR.DF SERPL CALC: 3.51
CK SERPL-CCNC: 37 U/L (ref 39–308)
CK-MB VALUE MASS: 1.3 NG/ML (ref 0–3.6)
CREATININE FOR GFR: 2.37 MG/DL (ref 0.7–1.3)
EOS #: 0 10^3/UL (ref 0–0.5)
EOSINOPHIL NFR BLD AUTO: 0.1 % (ref 0–3)
GFR SERPL CREATININE-BSD FRML MDRD: 28.7 ML/MIN/{1.73_M2} (ref 42–?)
GLUCOSE, FASTING: 85 MG/DL (ref 70–100)
HEMATOCRIT: 39.6 % (ref 42–52)
HEMOGLOBIN: 12 G/DL (ref 14–18)
IMMATURE GRANULOCYTE %: 0.9 % (ref 0–3)
INR: 1.86
LYMPH #: 0.7 10^3/UL (ref 1.5–4.5)
LYMPH %: 4.5 % (ref 24–44)
MEAN CORPUSCULAR HEMOGLOBIN: 25.4 PG (ref 27–33)
MEAN CORPUSCULAR HGB CONC: 30.3 G/DL (ref 32–36.5)
MEAN CORPUSCULAR VOLUME: 83.9 FL (ref 80–96)
MONO #: 0.8 10^3/UL (ref 0–0.8)
MONO %: 5.6 % (ref 0–5)
NEUTROPHILS #: 13.1 10^3/UL (ref 1.8–7.7)
NEUTROPHILS %: 88.8 % (ref 36–66)
NRBC BLD AUTO-RTO: 0 % (ref 0–0)
NT-PRO BNP: (no result) PG/ML (ref ?–125)
PLATELET COUNT, AUTOMATED: 197 10^3/UL (ref 150–450)
POTASSIUM SERUM: 4.5 MEQ/L (ref 3.5–5.1)
PROTHROMBIN TIME: 22 SECONDS (ref 12.4–14.5)
RED BLOOD COUNT: 4.72 10^6/UL (ref 4.3–6.1)
RED CELL DISTRIBUTION WIDTH: 21.2 % (ref 11.5–14.5)
SODIUM LEVEL: 138 MEQ/L (ref 136–145)
THYROID STIMULATING HORMONE: 2.53 UIU/ML (ref 0.36–3.74)
TROPONIN I: < 0.02 NG/ML (ref ?–0.1)
WHITE BLOOD COUNT: 14.8 10^3/UL (ref 4–10)

## 2018-02-22 RX ADMIN — ACETAMINOPHEN 1 MG: 325 TABLET ORAL at 23:09

## 2018-02-22 RX ADMIN — ASPIRIN 1 MG: 81 TABLET ORAL at 23:04

## 2018-02-22 RX ADMIN — GABAPENTIN 1 MG: 300 CAPSULE ORAL at 23:03

## 2018-02-22 RX ADMIN — DEXTROSE MONOHYDRATE 1 MG: 50 INJECTION, SOLUTION INTRAVENOUS at 16:24

## 2018-02-22 RX ADMIN — PRAVASTATIN SODIUM 1 MG: 20 TABLET ORAL at 23:03

## 2018-02-23 LAB
ALBUMIN/GLOBULIN RATIO: 0.57 (ref 1–1.93)
ALBUMIN: 2.5 GM/DL (ref 3.2–5.2)
ALKALINE PHOSPHATASE: 105 U/L (ref 45–117)
ALT SERPL W P-5'-P-CCNC: 15 U/L (ref 12–78)
ANION GAP: 10 MEQ/L (ref 8–16)
ANION GAP: 12 MEQ/L (ref 8–16)
AST SERPL-CCNC: 15 U/L (ref 7–37)
BILIRUBIN,TOTAL: 1.4 MG/DL (ref 0.2–1)
BLOOD UREA NITROGEN: 88 MG/DL (ref 7–18)
BLOOD UREA NITROGEN: 94 MG/DL (ref 7–18)
CALCIUM LEVEL: 8.5 MG/DL (ref 8.8–10.2)
CALCIUM LEVEL: 8.5 MG/DL (ref 8.8–10.2)
CARBON DIOXIDE LEVEL: 22 MEQ/L (ref 21–32)
CARBON DIOXIDE LEVEL: 25 MEQ/L (ref 21–32)
CHLORIDE LEVEL: 105 MEQ/L (ref 98–107)
CHLORIDE LEVEL: 106 MEQ/L (ref 98–107)
CREATININE FOR GFR: 2.07 MG/DL (ref 0.7–1.3)
CREATININE FOR GFR: 2.22 MG/DL (ref 0.7–1.3)
GFR SERPL CREATININE-BSD FRML MDRD: 31 ML/MIN/{1.73_M2} (ref 42–?)
GFR SERPL CREATININE-BSD FRML MDRD: 33.6 ML/MIN/{1.73_M2} (ref 42–?)
GLUCOSE, FASTING: 71 MG/DL (ref 70–100)
GLUCOSE, FASTING: 90 MG/DL (ref 70–100)
HEMATOCRIT: 37.1 % (ref 42–52)
HEMOGLOBIN: 11.2 G/DL (ref 14–18)
INR: 1.97
MAGNESIUM LEVEL: 2.2 MG/DL (ref 1.8–2.4)
MAGNESIUM LEVEL: 2.3 MG/DL (ref 1.8–2.4)
MEAN CORPUSCULAR HEMOGLOBIN: 24.8 PG (ref 27–33)
MEAN CORPUSCULAR HGB CONC: 30.2 G/DL (ref 32–36.5)
MEAN CORPUSCULAR VOLUME: 82.1 FL (ref 80–96)
NRBC BLD AUTO-RTO: 0.1 % (ref 0–0)
PLATELET COUNT, AUTOMATED: 213 10^3/UL (ref 150–450)
POTASSIUM SERUM: 3.8 MEQ/L (ref 3.5–5.1)
POTASSIUM SERUM: 3.9 MEQ/L (ref 3.5–5.1)
PROTHROMBIN TIME: 23.1 SECONDS (ref 12.4–14.5)
RED BLOOD COUNT: 4.52 10^6/UL (ref 4.3–6.1)
RED CELL DISTRIBUTION WIDTH: 21 % (ref 11.5–14.5)
SODIUM LEVEL: 140 MEQ/L (ref 136–145)
SODIUM LEVEL: 140 MEQ/L (ref 136–145)
TOTAL PROTEIN: 6.9 GM/DL (ref 6.4–8.2)
TROPONIN I: < 0.02 NG/ML (ref ?–0.1)
WHITE BLOOD COUNT: 13.8 10^3/UL (ref 4–10)

## 2018-02-23 RX ADMIN — ASPIRIN 1 MG: 81 TABLET ORAL at 21:06

## 2018-02-23 RX ADMIN — WARFARIN SODIUM 1 MG: 2 TABLET ORAL at 18:38

## 2018-02-23 RX ADMIN — PRAVASTATIN SODIUM 1 MG: 20 TABLET ORAL at 21:05

## 2018-02-23 RX ADMIN — ACETAMINOPHEN 1 MG: 325 TABLET ORAL at 18:37

## 2018-02-23 RX ADMIN — FUROSEMIDE 1 MG: 10 INJECTION, SOLUTION INTRAMUSCULAR; INTRAVENOUS at 08:53

## 2018-02-23 RX ADMIN — GABAPENTIN 1 MG: 300 CAPSULE ORAL at 21:06

## 2018-02-23 RX ADMIN — CALCITRIOL 1 MCG: 0.25 CAPSULE ORAL at 08:52

## 2018-02-23 RX ADMIN — POTASSIUM CHLORIDE 1 MEQ: 750 TABLET, EXTENDED RELEASE ORAL at 08:52

## 2018-02-23 RX ADMIN — ACETAMINOPHEN 1 MG: 325 TABLET ORAL at 04:08

## 2018-02-24 RX ADMIN — CALCITRIOL 1 MCG: 0.25 CAPSULE ORAL at 09:00

## 2018-02-24 RX ADMIN — TORSEMIDE 1 MG: 20 TABLET ORAL at 09:01

## 2018-02-24 RX ADMIN — ACETAMINOPHEN 1 MG: 325 TABLET ORAL at 09:06

## 2018-02-24 RX ADMIN — ACETAMINOPHEN 1 MG: 325 TABLET ORAL at 04:24
